# Patient Record
Sex: FEMALE | Race: WHITE | NOT HISPANIC OR LATINO | Employment: PART TIME | URBAN - METROPOLITAN AREA
[De-identification: names, ages, dates, MRNs, and addresses within clinical notes are randomized per-mention and may not be internally consistent; named-entity substitution may affect disease eponyms.]

---

## 2017-01-19 ENCOUNTER — NON-PROVIDER VISIT (OUTPATIENT)
Dept: URGENT CARE | Facility: PHYSICIAN GROUP | Age: 22
End: 2017-01-19

## 2017-01-19 DIAGNOSIS — Z11.1 SPECIAL SCREENING EXAMINATION FOR RESPIRATORY TUBERCULOSIS: ICD-10-CM

## 2017-01-19 PROCEDURE — 86580 TB INTRADERMAL TEST: CPT | Performed by: EMERGENCY MEDICINE

## 2017-01-21 ENCOUNTER — NON-PROVIDER VISIT (OUTPATIENT)
Dept: URGENT CARE | Facility: PHYSICIAN GROUP | Age: 22
End: 2017-01-21

## 2017-01-21 DIAGNOSIS — Z11.1 ENCOUNTER FOR PPD SKIN TEST READING: ICD-10-CM

## 2017-01-21 LAB — TB WHEAL 3D P 5 TU DIAM: 0 MM

## 2017-01-21 NOTE — MR AVS SNAPSHOT
Kelly Jovel   2017 4:45 PM   Non-Provider Visit   MRN: [id number]    Department:  [facility]   Dept Phone:  [phone]    Description:  Female : 1995   Provider:  [facility]           Reason for Visit     PPD Reading           Allergies as of 2017     No Known Allergies      You were diagnosed with     Encounter for PPD skin test reading   [[id number]]         Vital Signs     Smoking Status                   Never Smoker            Basic Information     Date Of Birth Sex Race Ethnicity Preferred Language    1995 Female White Non- English      Health Maintenance        Date Due Completion Dates    IMM HEP B VACCINE (1 of 3 - Primary Series) 1997 ---    IMM HEP A VACCINE (1 of 2 - Standard Series) 1998 ---    IMM HPV VACCINE (1 of 3 - Female 3 Dose Series) 2008 ---    IMM VARICELLA (CHICKENPOX) VACCINE (1 of 2 - 2 Dose Adolescent Series) 2010 ---    IMM MENINGOCOCCAL VACCINE (MCV4) (1 of 1) 2013 ---    IMM DTaP/Tdap/Td Vaccine (1 - Tdap) 2016 ---    IMM INFLUENZA (1) 2016 ---            Current Immunizations     Tuberculin Skin Test 2017      Below and/or attached are the medications your provider expects you to take. Review all of your home medications and newly ordered medications with your provider and/or pharmacist. Follow medication instructions as directed by your provider and/or pharmacist. Please keep your medication list with you and share with your provider. Update the information when medications are discontinued, doses are changed, or new medications (including over-the-counter products) are added; and carry medication information at all times in the event of emergency situations     Allergies:  No Known Allergies          Medications  Valid as of: 2017 -  4:50 PM    Generic Name Brand Name Tablet Size Instructions for use    Ibuprofen (Tab) MOTRIN 600 MG Take 1 Tab by mouth every 6 hours  as needed (with food.).        Norethin-Eth Estrad-Fe Biphas (Therapy Pack) Norethin-Eth Estrad-Fe Biphas 1 MG-10 MCG / 10 MCG Take  by mouth.        Sulfacetamide Sodium (Solution) SULAMYD 10 % Place 1 Drop in right eye every 4 hours.        .                 Medicines prescribed today were sent to:     [facility] [address] [place] [address] [place], [place] - [address] [facility] [employer] [facility] [address] [place] [address]    Phone: [phone] Fax: [phone]    Open 24 Hours?: No      Medication refill instructions:       If your prescription bottle indicates you have medication refills left, it is not necessary to call your provider’s office. Please contact your pharmacy and they will refill your medication.    If your prescription bottle indicates you do not have any refills left, you may request refills at any time through one of the following ways: The online [employer] system (except Urgent Care), by calling your provider’s office, or by asking your pharmacy to contact your provider’s office with a refill request. Medication refills are processed only during regular business hours and may not be available until the next business day. Your provider may request additional information or to have a follow-up visit with you prior to refilling your medication.   *Please Note: Medication refills are assigned a new Rx number when refilled electronically. Your pharmacy may indicate that no refills were authorized even though a new prescription for the same medication is available at the pharmacy. Please request the medicine by name with the pharmacy before contacting your provider for a refill.           [employer] Access Code: [email or web address]  Expires: 2/18/2017  3:28 PM    [employer]  A secure, online tool to manage your health information     [employer]’s [employer]® is a secure, online tool that connects you to your personalized health information from the privacy of your home -- day or night - making it very easy  for you to manage your healthcare. Once the activation process is completed, you can even access your medical information using the [employer] annette, which is available for free in the Apple Annette store or Google Play store.     [employer] provides the following levels of access (as shown below):   My Chart Features   [facility] Doctor [facility]  Specialists [facility]  Urgent  Care Non-[facility]  Primary Care  Doctor   Email your healthcare team securely and privately 24/7 X X X    Manage appointments: schedule your next appointment; view details of past/upcoming appointments X      Request prescription refills. X      View recent personal medical records, including lab and immunizations X X X X   View health record, including health history, allergies, medications X X X X   Read reports about your outpatient visits, procedures, consult and ER notes X X X X   See your discharge summary, which is a recap of your hospital and/or ER visit that includes your diagnosis, lab results, and care plan. X X       How to register for [employer]:  1. Go to  [email or web address].  2. Click on the Sign Up Now box, which takes you to the New Member Sign Up page. You will need to provide the following information:  a. Enter your [employer] Access Code exactly as it appears at the top of this page. (You will not need to use this code after you’ve completed the sign-up process. If you do not sign up before the expiration date, you must request a new code.)   b. Enter your date of birth.   c. Enter your home email address.   d. Click Submit, and follow the next screen’s instructions.  3. Create a [employer] ID. This will be your [employer] login ID and cannot be changed, so think of one that is secure and easy to remember.  4. Create a [employer] password. You can change your password at any time.  5. Enter your Password Reset Question and Answer. This can be used at a later time if you forget your password.   6. Enter your e-mail address. This allows  you to receive e-mail notifications when new information is available in [employer].  7. Click Sign Up. You can now view your health information.    For assistance activating your [employer] account, call [phone]

## 2017-01-22 NOTE — PROGRESS NOTES
Kelly Jovel is a 21 y.o. female here for a non-provider visit for PPD reading -- Step 1 of 1.      Resulted in Epic under original non-provider visit? Yes   TB evaluation questionnaire scanned into chart and original given to pt?Yes          Routed to PCP? No

## 2018-05-24 ENCOUNTER — HOSPITAL ENCOUNTER (OUTPATIENT)
Dept: LAB | Facility: MEDICAL CENTER | Age: 23
End: 2018-05-24
Attending: PHYSICIAN ASSISTANT
Payer: COMMERCIAL

## 2018-05-24 ENCOUNTER — OFFICE VISIT (OUTPATIENT)
Dept: URGENT CARE | Facility: PHYSICIAN GROUP | Age: 23
End: 2018-05-24
Payer: COMMERCIAL

## 2018-05-24 VITALS
BODY MASS INDEX: 17.84 KG/M2 | HEIGHT: 58 IN | WEIGHT: 85 LBS | DIASTOLIC BLOOD PRESSURE: 60 MMHG | HEART RATE: 82 BPM | TEMPERATURE: 99.4 F | OXYGEN SATURATION: 96 % | SYSTOLIC BLOOD PRESSURE: 96 MMHG

## 2018-05-24 DIAGNOSIS — B37.0 ORAL CANDIDIASIS: ICD-10-CM

## 2018-05-24 DIAGNOSIS — R11.2 NAUSEA AND VOMITING, INTRACTABILITY OF VOMITING NOT SPECIFIED, UNSPECIFIED VOMITING TYPE: ICD-10-CM

## 2018-05-24 LAB
ALBUMIN SERPL BCP-MCNC: 5.3 G/DL (ref 3.2–4.9)
ALBUMIN/GLOB SERPL: 2 G/DL
ALP SERPL-CCNC: 63 U/L (ref 30–99)
ALT SERPL-CCNC: 12 U/L (ref 2–50)
ANION GAP SERPL CALC-SCNC: 10 MMOL/L (ref 0–11.9)
AST SERPL-CCNC: 18 U/L (ref 12–45)
BASOPHILS # BLD AUTO: 0.6 % (ref 0–1.8)
BASOPHILS # BLD: 0.05 K/UL (ref 0–0.12)
BILIRUB SERPL-MCNC: 0.6 MG/DL (ref 0.1–1.5)
BUN SERPL-MCNC: 7 MG/DL (ref 8–22)
CALCIUM SERPL-MCNC: 10.1 MG/DL (ref 8.5–10.5)
CHLORIDE SERPL-SCNC: 103 MMOL/L (ref 96–112)
CO2 SERPL-SCNC: 23 MMOL/L (ref 20–33)
CREAT SERPL-MCNC: 0.76 MG/DL (ref 0.5–1.4)
EOSINOPHIL # BLD AUTO: 0 K/UL (ref 0–0.51)
EOSINOPHIL NFR BLD: 0 % (ref 0–6.9)
ERYTHROCYTE [DISTWIDTH] IN BLOOD BY AUTOMATED COUNT: 40.7 FL (ref 35.9–50)
GLOBULIN SER CALC-MCNC: 2.6 G/DL (ref 1.9–3.5)
GLUCOSE SERPL-MCNC: 94 MG/DL (ref 65–99)
HCT VFR BLD AUTO: 44.4 % (ref 37–47)
HGB BLD-MCNC: 14.9 G/DL (ref 12–16)
IMM GRANULOCYTES # BLD AUTO: 0.03 K/UL (ref 0–0.11)
IMM GRANULOCYTES NFR BLD AUTO: 0.3 % (ref 0–0.9)
INT CON NEG: NORMAL
INT CON POS: NORMAL
LIPASE SERPL-CCNC: 25 U/L (ref 11–82)
LYMPHOCYTES # BLD AUTO: 2.29 K/UL (ref 1–4.8)
LYMPHOCYTES NFR BLD: 26.6 % (ref 22–41)
MCH RBC QN AUTO: 29.8 PG (ref 27–33)
MCHC RBC AUTO-ENTMCNC: 33.6 G/DL (ref 33.6–35)
MCV RBC AUTO: 88.8 FL (ref 81.4–97.8)
MONOCYTES # BLD AUTO: 0.44 K/UL (ref 0–0.85)
MONOCYTES NFR BLD AUTO: 5.1 % (ref 0–13.4)
NEUTROPHILS # BLD AUTO: 5.79 K/UL (ref 2–7.15)
NEUTROPHILS NFR BLD: 67.4 % (ref 44–72)
NRBC # BLD AUTO: 0 K/UL
NRBC BLD-RTO: 0 /100 WBC
PLATELET # BLD AUTO: 220 K/UL (ref 164–446)
PMV BLD AUTO: 11.2 FL (ref 9–12.9)
POC URINE PREGNANCY TEST: NEGATIVE
POTASSIUM SERPL-SCNC: 4.2 MMOL/L (ref 3.6–5.5)
PROT SERPL-MCNC: 7.9 G/DL (ref 6–8.2)
RBC # BLD AUTO: 5 M/UL (ref 4.2–5.4)
SODIUM SERPL-SCNC: 136 MMOL/L (ref 135–145)
WBC # BLD AUTO: 8.6 K/UL (ref 4.8–10.8)

## 2018-05-24 PROCEDURE — 36415 COLL VENOUS BLD VENIPUNCTURE: CPT

## 2018-05-24 PROCEDURE — 81025 URINE PREGNANCY TEST: CPT | Performed by: PHYSICIAN ASSISTANT

## 2018-05-24 PROCEDURE — 83690 ASSAY OF LIPASE: CPT

## 2018-05-24 PROCEDURE — 99214 OFFICE O/P EST MOD 30 MIN: CPT | Performed by: PHYSICIAN ASSISTANT

## 2018-05-24 PROCEDURE — 85025 COMPLETE CBC W/AUTO DIFF WBC: CPT

## 2018-05-24 PROCEDURE — 80053 COMPREHEN METABOLIC PANEL: CPT

## 2018-05-24 RX ORDER — FLUCONAZOLE 150 MG/1
TABLET ORAL
Qty: 2 TAB | Refills: 0 | Status: ON HOLD | OUTPATIENT
Start: 2018-05-24 | End: 2023-08-26

## 2018-05-24 NOTE — PROGRESS NOTES
"Chief Complaint   Patient presents with   • Nausea     digestive problems, vomiting, poss reaction to meds, x1 week         HISTORY OF PRESENT ILLNESS: Patient is a 22 y.o. female who presents today for 1 week of intermittent, waxing and waning stomach discomfort and nausea/vomiting.    The last episode of vomiting was two days ago.  She has had some appetite but stomach seems upset after eating.  Some throat irritation.  No fevers or chills.   No diarrhea.      Patient did take some abx from Thursday - Sunday.  She was on Tinidazole for BV.   She does not drink alcohol but admits she does drink Kombucha which she believes has some alcohol in it.  Does smoke cannabis fairly regularly.     There are no active problems to display for this patient.      Allergies:Patient has no known allergies.    No current [facility]-ordered outpatient prescriptions on file.     No current [facility]-ordered facility-administered medications on file.        History reviewed. No pertinent past medical history.    Social History   Substance Use Topics   • Smoking status: Never Smoker   • Smokeless tobacco: Never Used   • Alcohol use No      Comment: Rarely        No family status information on file.   History reviewed. No pertinent family history.    ROS:  Review of Systems   Constitutional: Negative for fever, chills, weight loss and malaise/fatigue.   HENT: SEE HPI  Eyes: Negative for blurred vision.   Respiratory: Negative for cough, sputum production, shortness of breath and wheezing.    Cardiovascular: Negative for chest pain, palpitations, orthopnea and leg swelling.   Gastrointestinal: SEE HPI  Genitourinary: Negative for dysuria, urgency and frequency.     Exam:  Blood pressure (!) 96/60, pulse 82, temperature 37.4 °C (99.4 °F), height 1.473 m (4' 10\"), weight 38.6 kg (85 lb), SpO2 96 %.  General:  Well nourished, well developed female in NAD  Eyes: PERRLA, EOM within normal limits, no conjunctival injection, no scleral icterus, visual " fields and acuity grossly intact.  Ears: Normal shape and symmetry, no tenderness, no discharge. External canals are without any significant edema or erythema. Tympanic membranes are without any inflammation, no effusion. Gross auditory acuity is intact  Nose: Symmetrical, sinuses without tenderness, no discharge.   Mouth: reasonable hygiene, mild erythema, appears to have a few areas of faint brushable exudates.    Neck: no masses, range of motion within normal limits, no tracheal deviation. No lymphadenopathy  Pulmonary: Normal respiratory effort, no wheezes, crackles, or rhonchi.  Cardiovascular: regular rate and rhythm without murmurs, rubs, or gallops.  Abdomen: Soft, nontender, nondistended. Normal bowel sounds. No hepatosplenomegaly or masses, or hernias. No rebound or guarding.  Skin: No visible rashes or lesion. Warm, pink, dry.   Extremities: no clubbing, cyanosis, or edema.  Neuro: A&O x 3. Speech normal/clear.  Normal gait.         Assessment/Plan:  1. Nausea and vomiting, intractability of vomiting not specified, unspecified vomiting type  POCT Pregnancy    CBC WITH DIFFERENTIAL    COMP METABOLIC PANEL    LIPASE   2. Oral candidiasis  fluconazole (DIFLUCAN) 150 MG tablet       -preg neg.   -lab screening as above, will follow   -suspect abx have caused GI symptoms, possible mild reaction secondary to kombucha mild alcohol content.  She is done with abx but d/c this.  Recommend hydration, bland diet as tolerated.   She does have some evidence of very mild oral candida, likely secondary to abx as well, rx sent as above.      Supportive care, differential diagnoses, and indications for immediate follow-up discussed with patient.   Pathogenesis of diagnosis discussed including typical length and natural progression.   Instructed to return to clinic or nearest emergency department for any change in condition, further concerns, or worsening of symptoms.  Patient states understanding of the plan of care and  discharge instructions.  Instructed to make an appointment, for follow up, with their primary care provider.      Bela Alvarado P.A.-C.

## 2019-08-12 ENCOUNTER — OFFICE VISIT (OUTPATIENT)
Dept: URGENT CARE | Facility: PHYSICIAN GROUP | Age: 24
End: 2019-08-12
Payer: COMMERCIAL

## 2019-08-12 VITALS
SYSTOLIC BLOOD PRESSURE: 96 MMHG | TEMPERATURE: 99.2 F | WEIGHT: 87 LBS | OXYGEN SATURATION: 98 % | HEIGHT: 59 IN | DIASTOLIC BLOOD PRESSURE: 62 MMHG | RESPIRATION RATE: 16 BRPM | HEART RATE: 117 BPM | BODY MASS INDEX: 17.54 KG/M2

## 2019-08-12 DIAGNOSIS — J02.0 STREP PHARYNGITIS: ICD-10-CM

## 2019-08-12 LAB
INT CON NEG: NEGATIVE
INT CON POS: POSITIVE
S PYO AG THROAT QL: NORMAL

## 2019-08-12 PROCEDURE — 87880 STREP A ASSAY W/OPTIC: CPT | Performed by: PHYSICIAN ASSISTANT

## 2019-08-12 PROCEDURE — 99214 OFFICE O/P EST MOD 30 MIN: CPT | Performed by: PHYSICIAN ASSISTANT

## 2019-08-12 RX ORDER — AMOXICILLIN 500 MG/1
500 CAPSULE ORAL 2 TIMES DAILY
Qty: 20 CAP | Refills: 0 | Status: SHIPPED | OUTPATIENT
Start: 2019-08-12 | End: 2019-08-22

## 2019-08-12 ASSESSMENT — ENCOUNTER SYMPTOMS
HEADACHES: 1
FEVER: 0
COUGH: 0
CHILLS: 0
SORE THROAT: 1

## 2019-08-12 NOTE — PROGRESS NOTES
"Subjective:   Kelly Jovel is a 24 y.o. female who presents for Sore Throat (hurts to talk, hurts to swallow, headache, ear pain, X today )    This is a new problem.  Patient presents urgent care with sudden onset this morning of severe sore throat with difficulty swallowing due to sore throat as well as headache and ear pain.  Patient denies any recent runny nose, congestion or cough.  Denies fever or chills.  She has had no known exposure to strep.        HPI  Review of Systems   Constitutional: Negative for chills, fever and malaise/fatigue.   HENT: Positive for ear pain and sore throat. Negative for congestion.    Respiratory: Negative for cough.    Neurological: Positive for headaches.   All other systems reviewed and are negative.    No Known Allergies     Objective:   BP (!) 96/62   Pulse (!) 117   Temp 37.3 °C (99.2 °F)   Resp 16   Ht 1.499 m (4' 11\")   Wt 39.5 kg (87 lb)   SpO2 98%   BMI 17.57 kg/m²   Physical Exam   Constitutional: She is oriented to person, place, and time. She appears well-developed and well-nourished. She does not appear ill. No distress.   HENT:   Head: Normocephalic and atraumatic.   Right Ear: Tympanic membrane, external ear and ear canal normal.   Left Ear: Tympanic membrane, external ear and ear canal normal.   Nose: Nose normal.   Mouth/Throat: Uvula is midline and mucous membranes are normal. Posterior oropharyngeal erythema present. No oropharyngeal exudate. Tonsils are 3+ on the right. Tonsils are 3+ on the left. No tonsillar exudate.   Eyes: Pupils are equal, round, and reactive to light. Conjunctivae and EOM are normal.   Neck: Normal range of motion. Neck supple.   Cardiovascular: Normal rate, regular rhythm and normal heart sounds. Exam reveals no gallop and no friction rub.   No murmur heard.  Pulmonary/Chest: Effort normal and breath sounds normal. No respiratory distress. She has no wheezes. She has no rales.   Abdominal: Soft. Bowel sounds are normal. " She exhibits no distension and no mass. There is no hepatosplenomegaly. There is no tenderness. There is no rebound and no guarding.   Musculoskeletal: Normal range of motion. She exhibits no edema, tenderness or deformity.   Lymphadenopathy:        Head (right side): No submental, no submandibular and no tonsillar adenopathy present.        Head (left side): Tonsillar adenopathy present. No submental and no submandibular adenopathy present.     She has no cervical adenopathy.     She has no axillary adenopathy.        Right: No inguinal and no supraclavicular adenopathy present.        Left: No inguinal and no supraclavicular adenopathy present.   Neurological: She is alert and oriented to person, place, and time. She has normal strength. No cranial nerve deficit or sensory deficit. Coordination normal.   Skin: Skin is warm and dry. No rash noted.   Psychiatric: She has a normal mood and affect. Judgment normal.   Vitals reviewed.          Assessment/Plan:   Assessment    1. Strep pharyngitis  - POCT Rapid Strep A: Positive  - amoxicillin (AMOXIL) 500 MG Cap; Take 1 Cap by mouth 2 times a day for 10 days.  Dispense: 20 Cap; Refill: 0    Increase fluids, rest.  Patient may use salt water gargles, ice pops, cool fluids.    Patient will be treated with amoxicillin as above.  Contagion reviewed.  Recommend changing toothbrush in 24 hours.    Differential diagnosis, natural history, supportive care, and indications for immediate follow-up discussed.    If not improving in 3-5 days, F/U with PCP or return to  or sooner if worsens  Red flag warning symptoms and strict ER/follow-up precautions given.     The patient demonstrated a good understanding and agreed with the treatment plan.  Please note that this note was created using voice recognition speech to text software. Every effort has been made to correct obvious errors.  However, I expect there are errors of grammar and possibly context that were not discovered  prior to finalizing the note  KARMEN Llamas PA-C

## 2019-11-06 ENCOUNTER — HOSPITAL ENCOUNTER (OUTPATIENT)
Dept: LAB | Facility: MEDICAL CENTER | Age: 24
End: 2019-11-06
Attending: INTERNAL MEDICINE
Payer: COMMERCIAL

## 2019-11-06 ENCOUNTER — HOSPITAL ENCOUNTER (OUTPATIENT)
Dept: RADIOLOGY | Facility: MEDICAL CENTER | Age: 24
End: 2019-11-06
Attending: INTERNAL MEDICINE
Payer: COMMERCIAL

## 2019-11-06 DIAGNOSIS — R11.2 NAUSEA AND VOMITING, INTRACTABILITY OF VOMITING NOT SPECIFIED, UNSPECIFIED VOMITING TYPE: ICD-10-CM

## 2019-11-06 LAB
ALBUMIN SERPL BCP-MCNC: 5.1 G/DL (ref 3.2–4.9)
ALBUMIN/GLOB SERPL: 1.6 G/DL
ALP SERPL-CCNC: 65 U/L (ref 30–99)
ALT SERPL-CCNC: 9 U/L (ref 2–50)
ANION GAP SERPL CALC-SCNC: 9 MMOL/L (ref 0–11.9)
AST SERPL-CCNC: 18 U/L (ref 12–45)
BASOPHILS # BLD AUTO: 1 % (ref 0–1.8)
BASOPHILS # BLD: 0.06 K/UL (ref 0–0.12)
BILIRUB SERPL-MCNC: 0.4 MG/DL (ref 0.1–1.5)
BUN SERPL-MCNC: 8 MG/DL (ref 8–22)
CALCIUM SERPL-MCNC: 10 MG/DL (ref 8.5–10.5)
CHLORIDE SERPL-SCNC: 103 MMOL/L (ref 96–112)
CO2 SERPL-SCNC: 27 MMOL/L (ref 20–33)
CORTIS SERPL-MCNC: 16.8 UG/DL (ref 0–23)
CREAT SERPL-MCNC: 0.67 MG/DL (ref 0.5–1.4)
EOSINOPHIL # BLD AUTO: 0.06 K/UL (ref 0–0.51)
EOSINOPHIL NFR BLD: 1 % (ref 0–6.9)
ERYTHROCYTE [DISTWIDTH] IN BLOOD BY AUTOMATED COUNT: 42 FL (ref 35.9–50)
ERYTHROCYTE [SEDIMENTATION RATE] IN BLOOD BY WESTERGREN METHOD: 3 MM/HOUR (ref 0–20)
GLOBULIN SER CALC-MCNC: 3.1 G/DL (ref 1.9–3.5)
GLUCOSE SERPL-MCNC: 88 MG/DL (ref 65–99)
HCT VFR BLD AUTO: 45.4 % (ref 37–47)
HGB BLD-MCNC: 15 G/DL (ref 12–16)
IMM GRANULOCYTES # BLD AUTO: 0.01 K/UL (ref 0–0.11)
IMM GRANULOCYTES NFR BLD AUTO: 0.2 % (ref 0–0.9)
LYMPHOCYTES # BLD AUTO: 1.87 K/UL (ref 1–4.8)
LYMPHOCYTES NFR BLD: 31.7 % (ref 22–41)
MCH RBC QN AUTO: 29.9 PG (ref 27–33)
MCHC RBC AUTO-ENTMCNC: 33 G/DL (ref 33.6–35)
MCV RBC AUTO: 90.4 FL (ref 81.4–97.8)
MONOCYTES # BLD AUTO: 0.4 K/UL (ref 0–0.85)
MONOCYTES NFR BLD AUTO: 6.8 % (ref 0–13.4)
NEUTROPHILS # BLD AUTO: 3.49 K/UL (ref 2–7.15)
NEUTROPHILS NFR BLD: 59.3 % (ref 44–72)
NRBC # BLD AUTO: 0 K/UL
NRBC BLD-RTO: 0 /100 WBC
PLATELET # BLD AUTO: 201 K/UL (ref 164–446)
PMV BLD AUTO: 11.3 FL (ref 9–12.9)
POTASSIUM SERPL-SCNC: 4.1 MMOL/L (ref 3.6–5.5)
PROT SERPL-MCNC: 8.2 G/DL (ref 6–8.2)
RBC # BLD AUTO: 5.02 M/UL (ref 4.2–5.4)
SODIUM SERPL-SCNC: 139 MMOL/L (ref 135–145)
T4 FREE SERPL-MCNC: 0.81 NG/DL (ref 0.53–1.43)
TSH SERPL DL<=0.005 MIU/L-ACNC: 0.95 UIU/ML (ref 0.38–5.33)
WBC # BLD AUTO: 5.9 K/UL (ref 4.8–10.8)

## 2019-11-06 PROCEDURE — 85025 COMPLETE CBC W/AUTO DIFF WBC: CPT

## 2019-11-06 PROCEDURE — 82533 TOTAL CORTISOL: CPT

## 2019-11-06 PROCEDURE — 36415 COLL VENOUS BLD VENIPUNCTURE: CPT

## 2019-11-06 PROCEDURE — 80053 COMPREHEN METABOLIC PANEL: CPT

## 2019-11-06 PROCEDURE — 84443 ASSAY THYROID STIM HORMONE: CPT

## 2019-11-06 PROCEDURE — 84439 ASSAY OF FREE THYROXINE: CPT

## 2019-11-06 PROCEDURE — 85652 RBC SED RATE AUTOMATED: CPT

## 2019-11-06 PROCEDURE — 76700 US EXAM ABDOM COMPLETE: CPT

## 2020-05-27 ENCOUNTER — APPOINTMENT (RX ONLY)
Dept: URBAN - METROPOLITAN AREA CLINIC 4 | Facility: CLINIC | Age: 25
Setting detail: DERMATOLOGY
End: 2020-05-27

## 2020-05-27 DIAGNOSIS — B00.1 HERPESVIRAL VESICULAR DERMATITIS: ICD-10-CM

## 2020-05-27 PROCEDURE — 99202 OFFICE O/P NEW SF 15 MIN: CPT

## 2020-05-27 PROCEDURE — ? PRESCRIPTION

## 2020-05-27 PROCEDURE — ? DIAGNOSIS COMMENT

## 2020-05-27 PROCEDURE — ? COUNSELING

## 2020-05-27 PROCEDURE — ? ORDER TESTS

## 2020-05-27 RX ORDER — MUPIROCIN 20 MG/G
OINTMENT TOPICAL
Qty: 1 | Refills: 3 | Status: ERX | COMMUNITY
Start: 2020-05-27

## 2020-05-27 RX ADMIN — MUPIROCIN: 20 OINTMENT TOPICAL at 00:00

## 2020-05-27 ASSESSMENT — LOCATION DETAILED DESCRIPTION DERM: LOCATION DETAILED: RIGHT LOWER CUTANEOUS LIP

## 2020-05-27 ASSESSMENT — LOCATION ZONE DERM: LOCATION ZONE: LIP

## 2020-05-27 ASSESSMENT — LOCATION SIMPLE DESCRIPTION DERM: LOCATION SIMPLE: RIGHT LIP

## 2020-05-27 NOTE — PROCEDURE: DIAGNOSIS COMMENT
Comment: Favor HSV at this time, secondary bacterial infection also considered. Rx for valtrex and mupirocin given. Discussed viral and bacterial cultures, patient requests this be done today. \\nAvoid touching lesions. Use gentle facial wash. No other systemic symptoms. No new medications.
Detail Level: Detailed

## 2020-05-27 NOTE — HPI: EVALUATION OF SKIN LESION(S)
What Type Of Note Output Would You Prefer (Optional)?: Standard Output
How Severe Are Your Spot(S)?: mild
Have Your Spot(S) Been Treated In The Past?: has not been treated
Hpi Title: Evaluation of Skin Lesions
Additional History: Has pictures of progression

## 2020-05-27 NOTE — PROCEDURE: ORDER TESTS
Bill For Surgical Tray: no
Billing Type: Third-Party Bill
Performing Laboratory: [id number]
Expected Date Of Service: 05/27/2020

## 2023-08-15 ENCOUNTER — INITIAL PRENATAL (OUTPATIENT)
Dept: OBGYN | Facility: CLINIC | Age: 28
End: 2023-08-15
Payer: MEDICAID

## 2023-08-15 ENCOUNTER — APPOINTMENT (OUTPATIENT)
Dept: RADIOLOGY | Facility: IMAGING CENTER | Age: 28
End: 2023-08-15
Payer: MEDICAID

## 2023-08-15 VITALS — BODY MASS INDEX: 16.8 KG/M2 | SYSTOLIC BLOOD PRESSURE: 96 MMHG | DIASTOLIC BLOOD PRESSURE: 52 MMHG | WEIGHT: 83.2 LBS

## 2023-08-15 DIAGNOSIS — Z34.01 ENCOUNTER FOR SUPERVISION OF NORMAL FIRST PREGNANCY, FIRST TRIMESTER: Primary | ICD-10-CM

## 2023-08-15 DIAGNOSIS — Z3A.10 10 WEEKS GESTATION OF PREGNANCY: ICD-10-CM

## 2023-08-15 PROCEDURE — 0501F PRENATAL FLOW SHEET: CPT | Performed by: NURSE PRACTITIONER

## 2023-08-15 PROCEDURE — 3078F DIAST BP <80 MM HG: CPT | Performed by: NURSE PRACTITIONER

## 2023-08-15 PROCEDURE — 3074F SYST BP LT 130 MM HG: CPT | Performed by: NURSE PRACTITIONER

## 2023-08-15 PROCEDURE — 76801 OB US < 14 WKS SINGLE FETUS: CPT | Mod: TC | Performed by: NURSE PRACTITIONER

## 2023-08-15 ASSESSMENT — EDINBURGH POSTNATAL DEPRESSION SCALE (EPDS)
I HAVE BEEN ABLE TO LAUGH AND SEE THE FUNNY SIDE OF THINGS: AS MUCH AS I ALWAYS COULD
THE THOUGHT OF HARMING MYSELF HAS OCCURRED TO ME: NEVER
THINGS HAVE BEEN GETTING ON TOP OF ME: NO, MOST OF THE TIME I HAVE COPED QUITE WELL
I HAVE FELT SAD OR MISERABLE: NOT VERY OFTEN
I HAVE BEEN SO UNHAPPY THAT I HAVE HAD DIFFICULTY SLEEPING: NOT VERY OFTEN
I HAVE BLAMED MYSELF UNNECESSARILY WHEN THINGS WENT WRONG: YES, SOME OF THE TIME
I HAVE FELT SCARED OR PANICKY FOR NO GOOD REASON: YES, SOMETIMES
I HAVE BEEN SO UNHAPPY THAT I HAVE BEEN CRYING: ONLY OCCASIONALLY
I HAVE BEEN ANXIOUS OR WORRIED FOR NO GOOD REASON: YES, SOMETIMES
I HAVE LOOKED FORWARD WITH ENJOYMENT TO THINGS: AS MUCH AS I EVER DID
TOTAL SCORE: 10

## 2023-08-15 ASSESSMENT — ENCOUNTER SYMPTOMS
MUSCULOSKELETAL NEGATIVE: 1
CONSTITUTIONAL NEGATIVE: 1
CARDIOVASCULAR NEGATIVE: 1
NEUROLOGICAL NEGATIVE: 1
EYES NEGATIVE: 1
GASTROINTESTINAL NEGATIVE: 1
RESPIRATORY NEGATIVE: 1
PSYCHIATRIC NEGATIVE: 1

## 2023-08-15 NOTE — PROGRESS NOTES
S:  Kelly Jovel is a 28 y.o.  who presents for her new OB exam.  She is 6w1d with and YURIY of Estimated Date of Delivery: 24 based off of US done today. She thought she was 9w4d per LMP. Discussed reasons for dating discrepancy, and also informed that we would use US dating for her YURIY. She has no complaints.  She is currently working at [employer] as a . Discussed heavy lifting and chemical exposure. No ER visits or previous care in this pregnancy.     Unsure about AFP.  Declines CF.  Denies VB, LOF, or cramping.  Denies dysuria, vaginal DC. Reports no fetal movement.     Pt is single and lives by her self on her parents property in an .  Pregnancy is unplanned but desired. FOB is not involved.      Discussed diet and exercise during pregnancy. Encouraged good nutrition, and daily exercise including walking or swimming. Discussed expected weight gain during pregnancy. Discussed adequate hydration during pregnancy.    EPDS today is 10, no history of depression or anxiety. Thinks this is due to unplanned pregnancy and partner not involved. Denies any SI or HI, and feels like she has a great support system at home and work.    History reviewed. No pertinent past medical history.  Family History   Problem Relation Age of Onset    No Known Problems Mother     No Known Problems Father     No Known Problems Maternal Grandmother     No Known Problems Maternal Grandfather     No Known Problems Paternal Grandmother     Glaucoma Paternal Grandfather      Social History     Socioeconomic History    Marital status: Single     Spouse name: Not on file    Number of children: Not on file    Years of education: Not on file    Highest education level: Not on file   Occupational History    Not on file   Tobacco Use    Smoking status: Never    Smokeless tobacco: Never   Vaping Use    Vaping Use: Never used   Substance and Sexual Activity    Alcohol use: No     Comment: Rarely      Drug use: Yes     Types: Marijuana    Sexual activity: Not Currently     Partners: Male   Other Topics Concern    Behavioral problems Not Asked    Interpersonal relationships Not Asked    Sad or not enjoying activities Not Asked    Suicidal thoughts Not Asked    Poor school performance Not Asked    Reading difficulties Not Asked    Speech difficulties Not Asked    Writing difficulties Not Asked    Inadequate sleep Not Asked    Excessive TV viewing Not Asked    Excessive video game use Not Asked    Inadequate exercise Not Asked    Sports related Not Asked    Poor diet Not Asked    Family concerns for drug/alcohol abuse Not Asked    Poor oral hygiene Not Asked    Bike safety Not Asked    Family concerns vehicle safety Not Asked   Social History Narrative    Not on file     Social Determinants of Health     Financial Resource Strain: Not on file   Food Insecurity: Not on file   Transportation Needs: Not on file   Physical Activity: Not on file   Stress: Not on file   Social Connections: Not on file   Intimate Partner Violence: Not on file   Housing Stability: Not on file     OB History    Para Term  AB Living   1             SAB IAB Ectopic Molar Multiple Live Births                    # Outcome Date GA Lbr Trav/2nd Weight Sex Delivery Anes PTL Lv   1 Current                History of Varicella Virus: no  History of HSV I or II in self or partner: no  History of Thyroid problems: no    O:  BP 96/52   Wt 83 lb 3.2 oz    See Prenatal Physical.    Wet mount: deferred, no s/sx  Chaperone offered: n/a    A:   1.  IUP @ 6w1d per US done today        2.  S=D        3.  See problem list below        4.  Early first trimester pregnancy       Patient Active Problem List    Diagnosis Date Noted    Encounter for supervision of normal first pregnancy, first trimester 08/15/2023         P:  1.  GC/CT ordered through [facility], states had pap in  which was WNL- records requested        2.  Prenatal labs ordered - lab  slip given        3.  Discussed PNV, diet, avoidances and adequate water intake        4.  NOB packet given        5.  Return to office in 4 wks        6.  Complete OB US in 13 wks        7.  AFP and NIPT to be discussed later    Orders Placed This Encounter    US-OB 2ND 3RD TRI COMPLETE    Chlamydia/GC, PCR (Urine)    PREG CNTR PRENATAL PN    URINE DRUG SCREEN W/CONF (AR)    Prenatal MV-Min-Fe Fum-FA-DHA (PRENATAL 1 PO)        HPI    Review of Systems   Constitutional: Negative.    HENT: Negative.     Eyes: Negative.    Respiratory: Negative.     Cardiovascular: Negative.    Gastrointestinal: Negative.    Genitourinary: Negative.    Musculoskeletal: Negative.    Skin: Negative.    Neurological: Negative.    Endo/Heme/Allergies: Negative.    Psychiatric/Behavioral: Negative.     All other systems reviewed and are negative.      Objective     BP 96/52   Wt 83 lb 3.2 oz   LMP 06/09/2023   BMI 16.80 kg/m²      Physical Exam  Vitals and nursing note reviewed. Exam conducted with a chaperone present.   Constitutional:       Appearance: Normal appearance. She is normal weight.   HENT:      Head: Normocephalic.      Nose: Nose normal.   Eyes:      Extraocular Movements: Extraocular movements intact.   Cardiovascular:      Rate and Rhythm: Normal rate.      Pulses: Normal pulses.      Heart sounds: Normal heart sounds.   Pulmonary:      Effort: Pulmonary effort is normal.      Breath sounds: Normal breath sounds.   Abdominal:      Palpations: Abdomen is soft.   Musculoskeletal:         General: Normal range of motion.      Cervical back: Normal range of motion and neck supple.   Skin:     General: Skin is warm and dry.      Capillary Refill: Capillary refill takes less than 2 seconds.   Neurological:      General: No focal deficit present.      Mental Status: She is alert and oriented to person, place, and time.   Psychiatric:         Mood and Affect: Mood normal.         Behavior: Behavior normal.         Thought  Content: Thought content normal.         Judgment: Judgment normal.       Assessment & Plan       1. Encounter for supervision of normal first pregnancy, first trimester  YURIY 4/8/2024 per US  - Chlamydia/GC, PCR (Urine); Future  - PREG CNTR PRENATAL PN; Future  - URINE DRUG SCREEN W/CONF (AR); Future  - US-OB 2ND 3RD TRI COMPLETE; Future

## 2023-08-15 NOTE — PROGRESS NOTES
Pt. Here for NOB visit today.  # [phone]  First prenatal care  Pt. States no issues or concerns.   Pharmacy verified  Pt Declines AFP  Chaperone offered and not indicated    Pap- May 2021, WNL  EPDS 10

## 2023-08-22 ENCOUNTER — HOSPITAL ENCOUNTER (OUTPATIENT)
Dept: LAB | Facility: MEDICAL CENTER | Age: 28
End: 2023-08-22
Attending: NURSE PRACTITIONER
Payer: MEDICAID

## 2023-08-22 DIAGNOSIS — Z34.01 ENCOUNTER FOR SUPERVISION OF NORMAL FIRST PREGNANCY, FIRST TRIMESTER: ICD-10-CM

## 2023-08-22 LAB
ABO GROUP BLD: NORMAL
BLD GP AB SCN SERPL QL: NORMAL
ERYTHROCYTE [DISTWIDTH] IN BLOOD BY AUTOMATED COUNT: 45.3 FL (ref 35.9–50)
HBV SURFACE AG SER QL: NORMAL
HCT VFR BLD AUTO: 38.8 % (ref 37–47)
HCV AB SER QL: NORMAL
HGB BLD-MCNC: 13.1 G/DL (ref 12–16)
HIV 1+2 AB+HIV1 P24 AG SERPL QL IA: NORMAL
MCH RBC QN AUTO: 31.2 PG (ref 27–33)
MCHC RBC AUTO-ENTMCNC: 33.8 G/DL (ref 32.2–35.5)
MCV RBC AUTO: 92.4 FL (ref 81.4–97.8)
PLATELET # BLD AUTO: 206 K/UL (ref 164–446)
PMV BLD AUTO: 11.4 FL (ref 9–12.9)
RBC # BLD AUTO: 4.2 M/UL (ref 4.2–5.4)
RH BLD: NORMAL
RUBV AB SER QL: 169 IU/ML
T PALLIDUM AB SER QL IA: NORMAL
WBC # BLD AUTO: 8.7 K/UL (ref 4.8–10.8)

## 2023-08-22 PROCEDURE — 86900 BLOOD TYPING SEROLOGIC ABO: CPT

## 2023-08-22 PROCEDURE — 86901 BLOOD TYPING SEROLOGIC RH(D): CPT

## 2023-08-22 PROCEDURE — 86780 TREPONEMA PALLIDUM: CPT

## 2023-08-22 PROCEDURE — 87591 N.GONORRHOEAE DNA AMP PROB: CPT

## 2023-08-22 PROCEDURE — 80307 DRUG TEST PRSMV CHEM ANLYZR: CPT

## 2023-08-22 PROCEDURE — 87491 CHLMYD TRACH DNA AMP PROBE: CPT

## 2023-08-22 PROCEDURE — 85027 COMPLETE CBC AUTOMATED: CPT

## 2023-08-22 PROCEDURE — 86850 RBC ANTIBODY SCREEN: CPT

## 2023-08-22 PROCEDURE — 87389 HIV-1 AG W/HIV-1&-2 AB AG IA: CPT

## 2023-08-22 PROCEDURE — 87340 HEPATITIS B SURFACE AG IA: CPT

## 2023-08-22 PROCEDURE — 87086 URINE CULTURE/COLONY COUNT: CPT

## 2023-08-22 PROCEDURE — 86762 RUBELLA ANTIBODY: CPT

## 2023-08-22 PROCEDURE — 86803 HEPATITIS C AB TEST: CPT

## 2023-08-22 PROCEDURE — 36415 COLL VENOUS BLD VENIPUNCTURE: CPT

## 2023-08-23 PROBLEM — O26.899 RH NEGATIVE STATE IN ANTEPARTUM PERIOD: Status: ACTIVE | Noted: 2023-08-23

## 2023-08-23 PROBLEM — Z67.91 RH NEGATIVE STATE IN ANTEPARTUM PERIOD: Status: ACTIVE | Noted: 2023-08-23

## 2023-08-23 LAB
C TRACH DNA SPEC QL NAA+PROBE: NEGATIVE
N GONORRHOEA DNA SPEC QL NAA+PROBE: NEGATIVE
SPECIMEN SOURCE: NORMAL

## 2023-08-24 LAB
AMPHET CTO UR CFM-MCNC: NEGATIVE NG/ML
BACTERIA UR CULT: NORMAL
BARBITURATES CTO UR CFM-MCNC: NEGATIVE NG/ML
BENZODIAZ CTO UR CFM-MCNC: NEGATIVE NG/ML
CANNABINOIDS CTO UR CFM-MCNC: POSITIVE NG/ML
COCAINE CTO UR CFM-MCNC: NEGATIVE NG/ML
DRUG COMMENT 753798: NORMAL
METHADONE CTO UR CFM-MCNC: NEGATIVE NG/ML
OPIATES CTO UR CFM-MCNC: NEGATIVE NG/ML
PCP CTO UR CFM-MCNC: NEGATIVE NG/ML
PROPOXYPH CTO UR CFM-MCNC: NEGATIVE NG/ML
SIGNIFICANT IND 70042: NORMAL
SITE SITE: NORMAL
SOURCE SOURCE: NORMAL

## 2023-08-25 ENCOUNTER — TELEPHONE (OUTPATIENT)
Dept: OBGYN | Facility: CLINIC | Age: 28
End: 2023-08-25
Payer: MEDICAID

## 2023-08-25 NOTE — TELEPHONE ENCOUNTER
3:39 : Pt called concerned about the spotting she is having. States she has had some light cramping with light pink spotting. Pt is 7w4d and has an OBFU apt 9/12/23. Pt wanted a sooner apt if possible. I went over SAB precautions with her and told her things to look out for. I told her if anything changes and falls under what we discussed that she should go to the ER for evaluation. I then transferred her back to [facility] to see if there were any sooner OBFU's. Pt agreed and call was transferred.

## 2023-08-26 ENCOUNTER — HOSPITAL ENCOUNTER (EMERGENCY)
Facility: MEDICAL CENTER | Age: 28
End: 2023-08-26
Attending: EMERGENCY MEDICINE
Payer: MEDICAID

## 2023-08-26 ENCOUNTER — ANESTHESIA (OUTPATIENT)
Dept: SURGERY | Facility: MEDICAL CENTER | Age: 28
End: 2023-08-26
Payer: MEDICAID

## 2023-08-26 ENCOUNTER — ANESTHESIA EVENT (OUTPATIENT)
Dept: SURGERY | Facility: MEDICAL CENTER | Age: 28
End: 2023-08-26
Payer: MEDICAID

## 2023-08-26 ENCOUNTER — HOSPITAL ENCOUNTER (EMERGENCY)
Facility: MEDICAL CENTER | Age: 28
End: 2023-08-27
Attending: EMERGENCY MEDICINE
Payer: MEDICAID

## 2023-08-26 ENCOUNTER — APPOINTMENT (OUTPATIENT)
Dept: RADIOLOGY | Facility: MEDICAL CENTER | Age: 28
End: 2023-08-26
Attending: EMERGENCY MEDICINE
Payer: MEDICAID

## 2023-08-26 ENCOUNTER — APPOINTMENT (OUTPATIENT)
Dept: RADIOLOGY | Facility: MEDICAL CENTER | Age: 28
End: 2023-08-26
Attending: OBSTETRICS & GYNECOLOGY
Payer: MEDICAID

## 2023-08-26 VITALS
HEART RATE: 100 BPM | SYSTOLIC BLOOD PRESSURE: 96 MMHG | RESPIRATION RATE: 18 BRPM | DIASTOLIC BLOOD PRESSURE: 60 MMHG | WEIGHT: 85.98 LBS | HEIGHT: 59 IN | OXYGEN SATURATION: 98 % | BODY MASS INDEX: 17.33 KG/M2 | TEMPERATURE: 98.1 F

## 2023-08-26 DIAGNOSIS — O03.4: ICD-10-CM

## 2023-08-26 DIAGNOSIS — Z67.91 BLOOD TYPE, RH NEGATIVE: ICD-10-CM

## 2023-08-26 DIAGNOSIS — O03.9 MISCARRIAGE: ICD-10-CM

## 2023-08-26 DIAGNOSIS — I95.9 HYPOTENSION, UNSPECIFIED HYPOTENSION TYPE: ICD-10-CM

## 2023-08-26 LAB
ACTION RH IMMUNE GLOB 8505RHG: NORMAL
ALBUMIN SERPL BCP-MCNC: 4.7 G/DL (ref 3.2–4.9)
ALBUMIN SERPL BCP-MCNC: 4.8 G/DL (ref 3.2–4.9)
ALBUMIN/GLOB SERPL: 1.7 G/DL
ALBUMIN/GLOB SERPL: 1.7 G/DL
ALP SERPL-CCNC: 52 U/L (ref 30–99)
ALP SERPL-CCNC: 57 U/L (ref 30–99)
ALT SERPL-CCNC: 13 U/L (ref 2–50)
ALT SERPL-CCNC: 14 U/L (ref 2–50)
ANION GAP SERPL CALC-SCNC: 13 MMOL/L (ref 7–16)
ANION GAP SERPL CALC-SCNC: 16 MMOL/L (ref 7–16)
APPEARANCE UR: ABNORMAL
AST SERPL-CCNC: 17 U/L (ref 12–45)
AST SERPL-CCNC: 19 U/L (ref 12–45)
B-HCG SERPL-ACNC: ABNORMAL MIU/ML (ref 0–5)
BACTERIA #/AREA URNS HPF: NEGATIVE /HPF
BASOPHILS # BLD AUTO: 0.3 % (ref 0–1.8)
BASOPHILS # BLD AUTO: 0.6 % (ref 0–1.8)
BASOPHILS # BLD: 0.05 K/UL (ref 0–0.12)
BASOPHILS # BLD: 0.05 K/UL (ref 0–0.12)
BILIRUB SERPL-MCNC: 0.2 MG/DL (ref 0.1–1.5)
BILIRUB SERPL-MCNC: 0.3 MG/DL (ref 0.1–1.5)
BUN SERPL-MCNC: 6 MG/DL (ref 8–22)
BUN SERPL-MCNC: 7 MG/DL (ref 8–22)
CALCIUM ALBUM COR SERPL-MCNC: 8.9 MG/DL (ref 8.5–10.5)
CALCIUM ALBUM COR SERPL-MCNC: 9 MG/DL (ref 8.5–10.5)
CALCIUM SERPL-MCNC: 9.5 MG/DL (ref 8.5–10.5)
CALCIUM SERPL-MCNC: 9.6 MG/DL (ref 8.5–10.5)
CHLORIDE SERPL-SCNC: 103 MMOL/L (ref 96–112)
CHLORIDE SERPL-SCNC: 104 MMOL/L (ref 96–112)
CO2 SERPL-SCNC: 21 MMOL/L (ref 20–33)
CO2 SERPL-SCNC: 22 MMOL/L (ref 20–33)
COLOR UR: ABNORMAL
CREAT SERPL-MCNC: 0.53 MG/DL (ref 0.5–1.4)
CREAT SERPL-MCNC: 0.6 MG/DL (ref 0.5–1.4)
EOSINOPHIL # BLD AUTO: 0.01 K/UL (ref 0–0.51)
EOSINOPHIL # BLD AUTO: 0.1 K/UL (ref 0–0.51)
EOSINOPHIL NFR BLD: 0.1 % (ref 0–6.9)
EOSINOPHIL NFR BLD: 1.1 % (ref 0–6.9)
EPI CELLS #/AREA URNS HPF: NEGATIVE /HPF
ERYTHROCYTE [DISTWIDTH] IN BLOOD BY AUTOMATED COUNT: 42.7 FL (ref 35.9–50)
ERYTHROCYTE [DISTWIDTH] IN BLOOD BY AUTOMATED COUNT: 43.2 FL (ref 35.9–50)
GFR SERPLBLD CREATININE-BSD FMLA CKD-EPI: 127 ML/MIN/1.73 M 2
GFR SERPLBLD CREATININE-BSD FMLA CKD-EPI: 131 ML/MIN/1.73 M 2
GLOBULIN SER CALC-MCNC: 2.7 G/DL (ref 1.9–3.5)
GLOBULIN SER CALC-MCNC: 2.8 G/DL (ref 1.9–3.5)
GLUCOSE SERPL-MCNC: 121 MG/DL (ref 65–99)
GLUCOSE SERPL-MCNC: 139 MG/DL (ref 65–99)
HCT VFR BLD AUTO: 31.9 % (ref 37–47)
HCT VFR BLD AUTO: 41.2 % (ref 37–47)
HGB BLD-MCNC: 10.7 G/DL (ref 12–16)
HGB BLD-MCNC: 14 G/DL (ref 12–16)
HYALINE CASTS #/AREA URNS LPF: ABNORMAL /LPF
IMM GRANULOCYTES # BLD AUTO: 0.03 K/UL (ref 0–0.11)
IMM GRANULOCYTES # BLD AUTO: 0.08 K/UL (ref 0–0.11)
IMM GRANULOCYTES NFR BLD AUTO: 0.3 % (ref 0–0.9)
IMM GRANULOCYTES NFR BLD AUTO: 0.5 % (ref 0–0.9)
LIPASE SERPL-CCNC: 42 U/L (ref 11–82)
LYMPHOCYTES # BLD AUTO: 2.2 K/UL (ref 1–4.8)
LYMPHOCYTES # BLD AUTO: 2.41 K/UL (ref 1–4.8)
LYMPHOCYTES NFR BLD: 15.3 % (ref 22–41)
LYMPHOCYTES NFR BLD: 24.2 % (ref 22–41)
MCH RBC QN AUTO: 30.1 PG (ref 27–33)
MCH RBC QN AUTO: 31 PG (ref 27–33)
MCHC RBC AUTO-ENTMCNC: 33.5 G/DL (ref 32.2–35.5)
MCHC RBC AUTO-ENTMCNC: 34 G/DL (ref 32.2–35.5)
MCV RBC AUTO: 89.9 FL (ref 81.4–97.8)
MCV RBC AUTO: 91.2 FL (ref 81.4–97.8)
MICRO URNS: ABNORMAL
MONOCYTES # BLD AUTO: 0.53 K/UL (ref 0–0.85)
MONOCYTES # BLD AUTO: 0.74 K/UL (ref 0–0.85)
MONOCYTES NFR BLD AUTO: 4.7 % (ref 0–13.4)
MONOCYTES NFR BLD AUTO: 5.8 % (ref 0–13.4)
NEUTROPHILS # BLD AUTO: 12.44 K/UL (ref 1.82–7.42)
NEUTROPHILS # BLD AUTO: 6.18 K/UL (ref 1.82–7.42)
NEUTROPHILS NFR BLD: 68 % (ref 44–72)
NEUTROPHILS NFR BLD: 79.1 % (ref 44–72)
NRBC # BLD AUTO: 0 K/UL
NRBC # BLD AUTO: 0 K/UL
NRBC BLD-RTO: 0 /100 WBC (ref 0–0.2)
NRBC BLD-RTO: 0 /100 WBC (ref 0–0.2)
NUMBER OF RH DOSES IND 8505RD: 1
PLATELET # BLD AUTO: 221 K/UL (ref 164–446)
PLATELET # BLD AUTO: 265 K/UL (ref 164–446)
PMV BLD AUTO: 10.1 FL (ref 9–12.9)
PMV BLD AUTO: 10.3 FL (ref 9–12.9)
POTASSIUM SERPL-SCNC: 3.2 MMOL/L (ref 3.6–5.5)
POTASSIUM SERPL-SCNC: 3.9 MMOL/L (ref 3.6–5.5)
PROT SERPL-MCNC: 7.4 G/DL (ref 6–8.2)
PROT SERPL-MCNC: 7.6 G/DL (ref 6–8.2)
RBC # BLD AUTO: 3.55 M/UL (ref 4.2–5.4)
RBC # BLD AUTO: 4.52 M/UL (ref 4.2–5.4)
RBC # URNS HPF: >150 /HPF
RH BLD: NORMAL
SODIUM SERPL-SCNC: 138 MMOL/L (ref 135–145)
SODIUM SERPL-SCNC: 141 MMOL/L (ref 135–145)
THC UR CFM-MCNC: >500 NG/ML
WBC # BLD AUTO: 15.7 K/UL (ref 4.8–10.8)
WBC # BLD AUTO: 9.1 K/UL (ref 4.8–10.8)
WBC #/AREA URNS HPF: ABNORMAL /HPF

## 2023-08-26 PROCEDURE — 36415 COLL VENOUS BLD VENIPUNCTURE: CPT

## 2023-08-26 PROCEDURE — 96375 TX/PRO/DX INJ NEW DRUG ADDON: CPT

## 2023-08-26 PROCEDURE — 59812 TREATMENT OF MISCARRIAGE: CPT | Performed by: OBSTETRICS & GYNECOLOGY

## 2023-08-26 PROCEDURE — 160029 HCHG SURGERY MINUTES - 1ST 30 MINS LEVEL 4: Performed by: OBSTETRICS & GYNECOLOGY

## 2023-08-26 PROCEDURE — 85025 COMPLETE CBC W/AUTO DIFF WBC: CPT | Mod: 91

## 2023-08-26 PROCEDURE — 80053 COMPREHEN METABOLIC PANEL: CPT | Mod: 91

## 2023-08-26 PROCEDURE — 84702 CHORIONIC GONADOTROPIN TEST: CPT

## 2023-08-26 PROCEDURE — 83690 ASSAY OF LIPASE: CPT

## 2023-08-26 PROCEDURE — 85025 COMPLETE CBC W/AUTO DIFF WBC: CPT

## 2023-08-26 PROCEDURE — 700101 HCHG RX REV CODE 250: Mod: UD | Performed by: ANESTHESIOLOGY

## 2023-08-26 PROCEDURE — 87086 URINE CULTURE/COLONY COUNT: CPT

## 2023-08-26 PROCEDURE — 700111 HCHG RX REV CODE 636 W/ 250 OVERRIDE (IP): Mod: JZ,UD | Performed by: EMERGENCY MEDICINE

## 2023-08-26 PROCEDURE — 96374 THER/PROPH/DIAG INJ IV PUSH: CPT

## 2023-08-26 PROCEDURE — 160048 HCHG OR STATISTICAL LEVEL 1-5: Performed by: OBSTETRICS & GYNECOLOGY

## 2023-08-26 PROCEDURE — 76801 OB US < 14 WKS SINGLE FETUS: CPT

## 2023-08-26 PROCEDURE — 80053 COMPREHEN METABOLIC PANEL: CPT

## 2023-08-26 PROCEDURE — 160002 HCHG RECOVERY MINUTES (STAT): Performed by: OBSTETRICS & GYNECOLOGY

## 2023-08-26 PROCEDURE — 160009 HCHG ANES TIME/MIN: Performed by: OBSTETRICS & GYNECOLOGY

## 2023-08-26 PROCEDURE — 160036 HCHG PACU - EA ADDL 30 MINS PHASE I: Performed by: OBSTETRICS & GYNECOLOGY

## 2023-08-26 PROCEDURE — 700105 HCHG RX REV CODE 258: Mod: UD | Performed by: ANESTHESIOLOGY

## 2023-08-26 PROCEDURE — 99291 CRITICAL CARE FIRST HOUR: CPT

## 2023-08-26 PROCEDURE — 700111 HCHG RX REV CODE 636 W/ 250 OVERRIDE (IP): Mod: JZ,UD

## 2023-08-26 PROCEDURE — 88305 TISSUE EXAM BY PATHOLOGIST: CPT

## 2023-08-26 PROCEDURE — 160035 HCHG PACU - 1ST 60 MINS PHASE I: Performed by: OBSTETRICS & GYNECOLOGY

## 2023-08-26 PROCEDURE — 81001 URINALYSIS AUTO W/SCOPE: CPT

## 2023-08-26 PROCEDURE — 86901 BLOOD TYPING SEROLOGIC RH(D): CPT

## 2023-08-26 PROCEDURE — 96365 THER/PROPH/DIAG IV INF INIT: CPT

## 2023-08-26 PROCEDURE — 700111 HCHG RX REV CODE 636 W/ 250 OVERRIDE (IP): Mod: UD | Performed by: ANESTHESIOLOGY

## 2023-08-26 PROCEDURE — 99284 EMERGENCY DEPT VISIT MOD MDM: CPT

## 2023-08-26 PROCEDURE — 99284 EMERGENCY DEPT VISIT MOD MDM: CPT | Mod: 57 | Performed by: OBSTETRICS & GYNECOLOGY

## 2023-08-26 PROCEDURE — 160041 HCHG SURGERY MINUTES - EA ADDL 1 MIN LEVEL 4: Performed by: OBSTETRICS & GYNECOLOGY

## 2023-08-26 RX ORDER — CEFAZOLIN SODIUM 1 G/3ML
INJECTION, POWDER, FOR SOLUTION INTRAMUSCULAR; INTRAVENOUS PRN
Status: DISCONTINUED | OUTPATIENT
Start: 2023-08-26 | End: 2023-08-27 | Stop reason: SURG

## 2023-08-26 RX ORDER — HYDRALAZINE HYDROCHLORIDE 20 MG/ML
5 INJECTION INTRAMUSCULAR; INTRAVENOUS
Status: DISCONTINUED | OUTPATIENT
Start: 2023-08-26 | End: 2023-08-27 | Stop reason: HOSPADM

## 2023-08-26 RX ORDER — METOCLOPRAMIDE HYDROCHLORIDE 5 MG/ML
10 INJECTION INTRAMUSCULAR; INTRAVENOUS ONCE
Status: COMPLETED | OUTPATIENT
Start: 2023-08-26 | End: 2023-08-26

## 2023-08-26 RX ORDER — MIDAZOLAM HYDROCHLORIDE 1 MG/ML
INJECTION INTRAMUSCULAR; INTRAVENOUS PRN
Status: DISCONTINUED | OUTPATIENT
Start: 2023-08-26 | End: 2023-08-27 | Stop reason: SURG

## 2023-08-26 RX ORDER — LIDOCAINE HYDROCHLORIDE 20 MG/ML
INJECTION, SOLUTION EPIDURAL; INFILTRATION; INTRACAUDAL; PERINEURAL PRN
Status: DISCONTINUED | OUTPATIENT
Start: 2023-08-26 | End: 2023-08-27 | Stop reason: SURG

## 2023-08-26 RX ORDER — LABETALOL HYDROCHLORIDE 5 MG/ML
5 INJECTION, SOLUTION INTRAVENOUS
Status: DISCONTINUED | OUTPATIENT
Start: 2023-08-26 | End: 2023-08-27 | Stop reason: HOSPADM

## 2023-08-26 RX ORDER — DOXYCYCLINE 100 MG/1
100 CAPSULE ORAL 2 TIMES DAILY
Qty: 6 CAPSULE | Refills: 0 | Status: SHIPPED | OUTPATIENT
Start: 2023-08-26 | End: 2023-08-29

## 2023-08-26 RX ORDER — MEPERIDINE HYDROCHLORIDE 25 MG/ML
6.25 INJECTION INTRAMUSCULAR; INTRAVENOUS; SUBCUTANEOUS
Status: DISCONTINUED | OUTPATIENT
Start: 2023-08-26 | End: 2023-08-27 | Stop reason: HOSPADM

## 2023-08-26 RX ORDER — HYDROMORPHONE HYDROCHLORIDE 1 MG/ML
0.4 INJECTION, SOLUTION INTRAMUSCULAR; INTRAVENOUS; SUBCUTANEOUS
Status: DISCONTINUED | OUTPATIENT
Start: 2023-08-26 | End: 2023-08-27 | Stop reason: HOSPADM

## 2023-08-26 RX ORDER — HYDROMORPHONE HYDROCHLORIDE 1 MG/ML
0.2 INJECTION, SOLUTION INTRAMUSCULAR; INTRAVENOUS; SUBCUTANEOUS
Status: DISCONTINUED | OUTPATIENT
Start: 2023-08-26 | End: 2023-08-27 | Stop reason: HOSPADM

## 2023-08-26 RX ORDER — ONDANSETRON 2 MG/ML
4 INJECTION INTRAMUSCULAR; INTRAVENOUS
Status: DISCONTINUED | OUTPATIENT
Start: 2023-08-26 | End: 2023-08-27 | Stop reason: HOSPADM

## 2023-08-26 RX ORDER — SODIUM CHLORIDE, SODIUM LACTATE, POTASSIUM CHLORIDE, CALCIUM CHLORIDE 600; 310; 30; 20 MG/100ML; MG/100ML; MG/100ML; MG/100ML
INJECTION, SOLUTION INTRAVENOUS CONTINUOUS
Status: DISCONTINUED | OUTPATIENT
Start: 2023-08-26 | End: 2023-08-27 | Stop reason: HOSPADM

## 2023-08-26 RX ORDER — HYDROMORPHONE HYDROCHLORIDE 1 MG/ML
0.1 INJECTION, SOLUTION INTRAMUSCULAR; INTRAVENOUS; SUBCUTANEOUS
Status: DISCONTINUED | OUTPATIENT
Start: 2023-08-26 | End: 2023-08-27 | Stop reason: HOSPADM

## 2023-08-26 RX ORDER — EPHEDRINE SULFATE 50 MG/ML
5 INJECTION, SOLUTION INTRAVENOUS
Status: DISCONTINUED | OUTPATIENT
Start: 2023-08-26 | End: 2023-08-27 | Stop reason: HOSPADM

## 2023-08-26 RX ORDER — DIPHENHYDRAMINE HYDROCHLORIDE 50 MG/ML
12.5 INJECTION INTRAMUSCULAR; INTRAVENOUS
Status: DISCONTINUED | OUTPATIENT
Start: 2023-08-26 | End: 2023-08-27 | Stop reason: HOSPADM

## 2023-08-26 RX ORDER — METOCLOPRAMIDE HYDROCHLORIDE 5 MG/ML
INJECTION INTRAMUSCULAR; INTRAVENOUS PRN
Status: DISCONTINUED | OUTPATIENT
Start: 2023-08-26 | End: 2023-08-27 | Stop reason: SURG

## 2023-08-26 RX ORDER — POTASSIUM CHLORIDE 7.45 MG/ML
10 INJECTION INTRAVENOUS ONCE
Status: COMPLETED | OUTPATIENT
Start: 2023-08-26 | End: 2023-08-26

## 2023-08-26 RX ORDER — ONDANSETRON 2 MG/ML
INJECTION INTRAMUSCULAR; INTRAVENOUS
Status: COMPLETED
Start: 2023-08-26 | End: 2023-08-26

## 2023-08-26 RX ORDER — DEXAMETHASONE SODIUM PHOSPHATE 4 MG/ML
INJECTION, SOLUTION INTRA-ARTICULAR; INTRALESIONAL; INTRAMUSCULAR; INTRAVENOUS; SOFT TISSUE PRN
Status: DISCONTINUED | OUTPATIENT
Start: 2023-08-26 | End: 2023-08-27 | Stop reason: SURG

## 2023-08-26 RX ORDER — ONDANSETRON 2 MG/ML
4 INJECTION INTRAMUSCULAR; INTRAVENOUS EVERY 4 HOURS PRN
Status: DISCONTINUED | OUTPATIENT
Start: 2023-08-26 | End: 2023-08-27 | Stop reason: HOSPADM

## 2023-08-26 RX ORDER — ONDANSETRON 2 MG/ML
INJECTION INTRAMUSCULAR; INTRAVENOUS PRN
Status: DISCONTINUED | OUTPATIENT
Start: 2023-08-26 | End: 2023-08-27 | Stop reason: SURG

## 2023-08-26 RX ORDER — MIDAZOLAM HYDROCHLORIDE 1 MG/ML
0.5 INJECTION INTRAMUSCULAR; INTRAVENOUS
Status: DISCONTINUED | OUTPATIENT
Start: 2023-08-26 | End: 2023-08-27 | Stop reason: HOSPADM

## 2023-08-26 RX ADMIN — SODIUM CHLORIDE, POTASSIUM CHLORIDE, SODIUM LACTATE AND CALCIUM CHLORIDE: 600; 310; 30; 20 INJECTION, SOLUTION INTRAVENOUS at 22:36

## 2023-08-26 RX ADMIN — FENTANYL CITRATE 50 MCG: 50 INJECTION, SOLUTION INTRAMUSCULAR; INTRAVENOUS at 22:59

## 2023-08-26 RX ADMIN — FENTANYL CITRATE 25 MCG: 50 INJECTION, SOLUTION INTRAMUSCULAR; INTRAVENOUS at 22:36

## 2023-08-26 RX ADMIN — DEXAMETHASONE SODIUM PHOSPHATE 4 MG: 4 INJECTION INTRA-ARTICULAR; INTRALESIONAL; INTRAMUSCULAR; INTRAVENOUS; SOFT TISSUE at 22:42

## 2023-08-26 RX ADMIN — METOCLOPRAMIDE 10 MG: 5 INJECTION, SOLUTION INTRAMUSCULAR; INTRAVENOUS at 22:36

## 2023-08-26 RX ADMIN — ONDANSETRON 4 MG: 2 INJECTION INTRAMUSCULAR; INTRAVENOUS at 16:50

## 2023-08-26 RX ADMIN — FENTANYL CITRATE 25 MCG: 50 INJECTION, SOLUTION INTRAMUSCULAR; INTRAVENOUS at 22:48

## 2023-08-26 RX ADMIN — METOCLOPRAMIDE 10 MG: 5 INJECTION, SOLUTION INTRAMUSCULAR; INTRAVENOUS at 18:36

## 2023-08-26 RX ADMIN — GLYCOPYRROLATE 0.1 MG: 0.2 INJECTION INTRAMUSCULAR; INTRAVENOUS at 22:36

## 2023-08-26 RX ADMIN — CEFAZOLIN 2 G: 1 INJECTION, POWDER, FOR SOLUTION INTRAMUSCULAR; INTRAVENOUS at 22:42

## 2023-08-26 RX ADMIN — POTASSIUM CHLORIDE 10 MEQ: 7.46 INJECTION, SOLUTION INTRAVENOUS at 17:14

## 2023-08-26 RX ADMIN — LIDOCAINE HYDROCHLORIDE 40 MG: 20 INJECTION, SOLUTION EPIDURAL; INFILTRATION; INTRACAUDAL at 22:36

## 2023-08-26 RX ADMIN — ONDANSETRON 4 MG: 2 INJECTION INTRAMUSCULAR; INTRAVENOUS at 23:02

## 2023-08-26 RX ADMIN — ONDANSETRON 4 MG: 2 INJECTION INTRAMUSCULAR; INTRAVENOUS at 15:57

## 2023-08-26 RX ADMIN — PROPOFOL 100 MG: 10 INJECTION, EMULSION INTRAVENOUS at 22:40

## 2023-08-26 RX ADMIN — MIDAZOLAM 2 MG: 1 INJECTION, SOLUTION INTRAMUSCULAR; INTRAVENOUS at 22:36

## 2023-08-26 NOTE — ED PROVIDER NOTES
Emergency Physician Note    Chief Concern:  Chief Complaint   Patient presents with    Abdominal Pain    Vaginal Bleeding     HPI/ROS   Outside Historians:   Family Member     External Records Reviewed:  Outpatient Notes Ms. Jovel was seen in OB/GYN clinic 8/15/2023.  Certified nurse midwife note reviewed from that visit.  She is G1, P0 who presented for an initial OB exam.  At that visit she was 6 weeks 1 day with an estimated date of delivery 4/8/24 by first trimester ultrasound dating.  No acute concerns identified.    HPI:  Kelly Jovel is a 28 y.o. female who is G1, P0.  She should be at 7 weeks 5 days estimated gestational age by first trimester ultrasound, however ultrasound performed earlier today demonstrated no fetal heart rate, and crown-rump length consistent with 6 weeks 0 days consistent with fetal demise.  She presented to this emergency department earlier this morning out of concern for vaginal bleeding and passing clots.  She was diagnosed with a miscarriage, and was discharged home.  She states that her bleeding continued, she continued to pass clots.  She began to feel nauseated, and tried to take a shower but had a syncopal episode in the shower, prompting her to return to the emergency department.  On arrival, heart rate was 130 with blood pressure of 99/75.  She continued to have lightheadedness and vomiting, which was treated with Zofran.  She has had ongoing cramping since early this morning, states bleeding has been persistent since that time.  She has not had any worsening bleeding, nor worsening clots, however she has continued to feel more fatigued and lightheaded, as well as nauseated as the day has progressed.  She reports no significant past medical history.  Family member at bedside states that she has been bleeding heavily since this morning.      PAST MEDICAL HISTORY  History reviewed. No pertinent past medical history.    SURGICAL HISTORY  History reviewed. No  pertinent surgical history.    FAMILY HISTORY  Family History   Problem Relation Age of Onset    No Known Problems Mother     No Known Problems Father     No Known Problems Maternal Grandmother     No Known Problems Maternal Grandfather     No Known Problems Paternal Grandmother     Glaucoma Paternal Grandfather        SOCIAL HISTORY   reports that she has never smoked. She has never used smokeless tobacco. She reports current drug use. Drug: Marijuana. She reports that she does not drink alcohol.    CURRENT MEDICATIONS  Current Discharge Medication List        CONTINUE these medications which have NOT CHANGED    Details   Prenatal MV-Min-Fe Fum-FA-DHA (PRENATAL 1 PO) Take  by mouth.      fluconazole (DIFLUCAN) 150 MG tablet Take 1 tablet once.  Repeat in 72 hours if needed  Qty: 2 Tab, Refills: 0    Associated Diagnoses: Oral candidiasis             ALLERGIES  Patient has no known allergies.    PHYSICAL EXAM  Vital Signs: BP 96/52   Pulse 98   Temp 36.1 °C (97 °F) (Temporal)   Resp 20   LMP 06/09/2023   SpO2 98%     Constitutional: Alert, no acute distress  HENT: Normocephalic, atraumatic.  Cardiovascular: Regular rhythm, tachycardic rate  Pulmonary: Breath sounds quiet and equal bilaterally, no wheezing, no coarse breath sounds  Abdomen: Soft, uncomfortable on palpation  : External genitalia is normal-appearing, no brisk bleeding from the introitus.  Patient was initially unable to tolerate speculum exam, smaller speculum was not immediately available.  Skin: Warm, dry, no rashes or lesions  Musculoskeletal: Normal range of motion in all extremities, no swelling or deformity noted  Neurologic: Alert, oriented, normal motor function, no speech deficits  Psychiatric: Normal and appropriate mood and affect    Diagnostic Studies & Procedures    Labs:  All labs reviewed by me as noted below.    Radiology:  The attending Emergency Physician has independently interpreted the following imaging:  I independently  interpreted the ultrasound imaging from the transvaginal ultrasound performed during this visit.  I do not visualize any intrauterine pregnancy.    US-OB 1ST TRIMESTER WITH TRANSVAGINAL (COMBO)   Final Result      Findings consistent with spontaneous  in progress with complex cystic structure within the cervical canal likely indicating gestational sac.  Complex fluid within the endometrial canal likely indicates clot.        I reviewed the radiology report from first trimester ultrasound performed earlier today.  Findings consistent with embryonic demise, fetal heart motion is no longer present.  Probable clot or hemorrhage noted within the endocervical canal.    Course and Medical Decision Making    ED Observation Status? Yes; Differential diagnosis includes severe or life threatening conditions including: Hemorrhagic shock.  Due to diagnostic uncertainty and therapeutic intensity at this time, patient placed in observation status at 4:30 PM, 2023.     Observation plan is as follows: Lab studies, advanced imaging, specialist consult, ongoing hemodynamic monitoring, serial reassessments    Upon Reevaluation, the patient's condition has: not improved; and will be escalated to hospitalization.    Discharged from ED observation at 10:09 PM, 2023.  Complexity: High    Initial Assessment and Plan  Care Narrative: Ms. Jovel presents to the emergency department today for evaluation of vaginal bleeding in the setting of miscarriage in first trimester pregnancy.  On arrival she is tachycardic and hypotensive, IV fluid bolus was initiated.  She also had nausea, this was treated with Zofran.  She is afebrile on arrival.  Initially, she was not able to tolerate speculum exam, smaller speculum was not immediately available.  Central supply was contacted to obtain an appropriate size speculum.  No brisk bleeding from the introitus identified.    On laboratory evaluation White blood count is 15.7, up from  9.1 earlier today, believe this is likely reactive.  Hemoglobin has dropped from 14.0-10.7, she did not receive a fluid bolus during her earlier visit, drop in hemoglobin is not dilutional due to fluid administration.  On laboratory evaluation CMP demonstrates potassium of 3.2, this was replaced via IV in the emergency department.  Prior labs are reviewed, she is Rh-, RhoGAM was administered earlier today.    Call was placed to Dr. Main, who kindly evaluated the patient in the emergency department.  Cervical os has closed, however due to abnormal vital signs on arrival, as well as significant drop in hemoglobin she ordered a repeat pelvic ultrasound to evaluate for any retained products.    Receiving IV fluid bolus heart rate has normalized, resting heart rate is now 90.  Systolic blood pressure is improved to 104/66.  Bleeding seems to have slowed.    Repeat ultrasound shows complex cystic structure within the cervical canal likely indicating gestational sac.  Images were reviewed by myself, and discussed with Dr. Main who also reviewed the imaging.  It appears as though spontaneous miscarriage is complete.  On my reassessment she feels significantly improved, is no longer having heavy bleeding, however systolic blood pressures in the 80s with heart rate in the 90s.  Dr. Main reassessed the patient after receiving the ultrasound, plan at this time is for admission for ongoing monitoring of vital signs given hypotensive and and tachycardia, with plan for D&C to be performed by OB/GYN.    Additional Problems and Disposition    I have discussed management of the patient with the following physicians and ALLY's: Dr. Main (OB/GYN).    DISPOSITION:  Patient will be hospitalized by Dr. Main in guarded condition.    FINAL IMPRESSION   1. Miscarriage    2. Hypotension, unspecified hypotension type      I, Charlie Johnson (Scribe), am scribing for, and in the presence of, Sara Beltrán,  M.D.    Electronically signed by: Charlie Johnson (Scribe), 8/26/2023    ISara M.D. personally performed the services described in this documentation, as scribed by Charlie Johnson in my presence, and it is both accurate and complete.    The note accurately reflects work and decisions made by me.  Sara Beltrán M.D.  8/26/2023  11:40 PM

## 2023-08-26 NOTE — ED TRIAGE NOTES
"Kelly Jovel  28 y.o. female    Chief Complaint   Patient presents with    Abdominal Pain    Vaginal Bleeding     Pt arrives with complaints of continued vaginal bleeding and lower pelvic cramping that has continued since earlier today. Pt states she was diagnosed with a miscarriage earlier today and now is feeling lightheaded. States she is passing \"baseball sized clots\" but only when she goes to toilet- minimal bleeding onto pads in between bathroom trips. Pt states she syncopized in shower earlier when she was feeling lightheaded. Denies hitting head.     Pt states she began vomiting today. Pt appears pale in triage. Dry heaving and vomiting.     Vitals:    08/26/23 1501   BP: 99/75   Pulse: (!) 130   Resp: 20   Temp: 36.1 °C (97 °F)   SpO2: 100%       Triage process explained to patient, apologized for wait time, and returned to lobby.  Pt informed to notify staff of any change in condition.     "

## 2023-08-26 NOTE — ED TRIAGE NOTES
Chief Complaint   Patient presents with    Vaginal Bleeding     Pt to be about 7wks pregnant, confirmed by U/S. Pt states vag bleed this am, bright red in nature with abd cramping.      Pt given urine cup/instruction.  Explained to pt triage process, made pt aware to tell this RN/staff of any changes/concerns, pt verbalized understanding of process and instructions given. Pt to ER alaina.

## 2023-08-26 NOTE — ED NOTES
Pt medicated per MAR. DC instructions reviewed with pt. Pt verbalized understanding. Pt ambulated to [facility] with steady gait.

## 2023-08-26 NOTE — ED PROVIDER NOTES
"ED Provider Note    CHIEF COMPLAINT  Chief Complaint   Patient presents with    Vaginal Bleeding     Pt to be about 7wks pregnant, confirmed by U/S. Pt states vag bleed this am, bright red in nature with abd cramping.        EXTERNAL RECORDS REVIEWED  Outpatient Notes   On August 15, 2023 the patient had an ultrasound that showed a 6-week 1 day IUP    HPI/ROS  LIMITATION TO HISTORY   Select: : None  OUTSIDE HISTORIAN(S):  The patient's mother    Kelly Jovel is a 28 y.o. female who presents history G1, P0, she had an ultrasound performed on August 15, 2023, 10 days ago that showed a 6-week 1 day IUP.  She at that visit had prenatal labs sent.  Patient presents stating that today she had vaginal bleeding with clots that began this morning.  She has had some crampy abdominal pain.    PAST MEDICAL HISTORY   G1, P0    SURGICAL HISTORY  patient denies any surgical history    FAMILY HISTORY  Family History   Problem Relation Age of Onset    No Known Problems Mother     No Known Problems Father     No Known Problems Maternal Grandmother     No Known Problems Maternal Grandfather     No Known Problems Paternal Grandmother     Glaucoma Paternal Grandfather        SOCIAL HISTORY  Social History     Tobacco Use    Smoking status: Never    Smokeless tobacco: Never   Vaping Use    Vaping Use: Never used   Substance and Sexual Activity    Alcohol use: No     Comment: Rarely     Drug use: Yes     Types: Marijuana    Sexual activity: Not Currently     Partners: Male       CURRENT MEDICATIONS  Prenatal vitamins      ALLERGIES  No Known Allergies    PHYSICAL EXAM  VITAL SIGNS: /71   Pulse 87   Temp 36.4 °C (97.6 °F) (Temporal)   Resp 18   Ht 1.499 m (4' 11\")   Wt 39 kg (85 lb 15.7 oz)   LMP 06/09/2023   SpO2 99%   BMI 17.37 kg/m²    Constitutional: Alert.  HENT: No signs of trauma, Bilateral external ears normal, Nose normal.   Eyes: Pupils are equal and reactive, Conjunctiva normal, Non-icteric.   Neck: Normal " range of motion, No tenderness, Supple, No stridor.   Lymphatic: No lymphadenopathy noted.   Cardiovascular: Regular rate and rhythm, no murmurs.   Thorax & Lungs: Normal breath sounds, No respiratory distress, No wheezing, No chest tenderness.   Abdomen: Bowel sounds normal, Soft, No tenderness, No peritoneal signs, No masses, No pulsatile masses.   Skin: Warm, Dry, No erythema, No rash.   Back: No bony tenderness, No CVA tenderness.   Extremities: Intact distal pulses, No edema, No tenderness, No cyanosis.  Musculoskeletal: Good range of motion in all major joints. No tenderness to palpation or major deformities noted.   Neurologic: Alert , Normal motor function, Normal sensory function, No focal deficits noted.   Psychiatric: Affect normal, Judgment normal, Mood normal.       DIAGNOSTIC STUDIES / PROCEDURES      LABS  Labs Reviewed   COMP METABOLIC PANEL - Abnormal; Notable for the following components:       Result Value    Glucose 121 (*)     Bun 6 (*)     All other components within normal limits   HCG QUANTITATIVE - Abnormal; Notable for the following components:    Bhcg 19437.0 (*)     All other components within normal limits   URINALYSIS,CULTURE IF INDICATED - Abnormal; Notable for the following components:    Color Red (*)     Character Cloudy (*)     All other components within normal limits    Narrative:     Indication for culture:->Pregnant women: fever and/or  asymptomatic screening   URINE MICROSCOPIC (W/UA) - Abnormal; Notable for the following components:    WBC 10-20 (*)     RBC >150 (*)     All other components within normal limits    Narrative:     Indication for culture:->Pregnant women: fever and/or  asymptomatic screening   CBC WITH DIFFERENTIAL   LIPASE   RH TYPE FOR RHOGAM FROM E.D.    Narrative:     Print Consent?->No   ESTIMATED GFR   ACTION: RH IMMUNE GLOBULIN    Narrative:     Print Consent?->No   URINE CULTURE(NEW)    Narrative:     Indication for culture:->Pregnant women: fever  and/or  asymptomatic screening         RADIOLOGY  I have independently interpreted the diagnostic imaging associated with this visit and am waiting the final reading from the radiologist.   My preliminary interpretation is as follows: No fetal heartbeat  Radiologist interpretation:     US-OB 1ST TRIMESTER SINGLE GEST Is the patient pregnant? Yes   Final Result      1.  Findings are consistent with embryonic demise. There is no increase in size of fetal pole and fetal heart motion which was present on the prior exam is no longer present.      2.  Probable clot or hemorrhage within the endocervical canal.            COURSE & MEDICAL DECISION MAKING    ED Observation Status? Yes; I am placing the patient in to an observation status due to a diagnostic uncertainty as well as therapeutic intensity. Patient placed in observation status at 9:05 AM, 8/26/2023.     Observation plan is as follows: The patient presents with vaginal bleeding during pregnancy.  Labs were ordered to assess for anemia, ultrasound was ordered.    Upon Reevaluation, the patient's condition has: Improved; and will be discharged.    Patient discharged from ED Observation status at 11:02 AM (Time) August 26, 2023 (Date).     INITIAL ASSESSMENT, COURSE AND PLAN  Care Narrative:     The patient's blood type is Rh-, her ultrasound shows fetal demise.  Patient is given RhoGAM.  [facility] and return for worsening symptoms.  I have offered her stronger pain and nausea medication than what is over-the-counter but she would not like a prescription at this time.        ADDITIONAL PROBLEM LIST  Rh- blood type  DISPOSITION AND DISCUSSIONS  I have discussed management of the patient with the following physicians and ALLY's:  none    Discussion of management with other QHP or appropriate source(s): None     Escalation of care considered, and ultimately not performed:acute inpatient care management, however at this time, the patient is  most appropriate for outpatient management    Barriers to care at this time, including but not limited to:  None .     Decision tools and prescription drugs considered including, but not limited to:  Patient given RhoGAM for Rh- blood type with vaginal bleeding in pregnancy .       The patient will return for new or worsening symptoms and is stable at the time of discharge.    The patient is referred to a primary physician for blood pressure management, diabetic screening, and for all other preventative health concerns.        DISPOSITION:  Patient will be discharged home in stable condition.    FOLLOW UP:  [facility], Emergency Dept  [address] [address] [phone]  Follow up  If symptoms worsen    [facility]  [address]  [facility] [place] [id number] [phone]  Follow up        OUTPATIENT MEDICATIONS:  New Prescriptions    No medications on file         FINAL DIAGNOSIS  1. Miscarriage    2. Blood type, Rh negative           Electronically signed by: Tyson Mercedes M.D., 8/26/2023 9:05 AM

## 2023-08-27 VITALS
HEART RATE: 75 BPM | SYSTOLIC BLOOD PRESSURE: 107 MMHG | TEMPERATURE: 98.1 F | RESPIRATION RATE: 20 BRPM | DIASTOLIC BLOOD PRESSURE: 58 MMHG | OXYGEN SATURATION: 98 %

## 2023-08-27 RX ORDER — SODIUM CHLORIDE, SODIUM LACTATE, POTASSIUM CHLORIDE, CALCIUM CHLORIDE 600; 310; 30; 20 MG/100ML; MG/100ML; MG/100ML; MG/100ML
INJECTION, SOLUTION INTRAVENOUS
Status: DISCONTINUED | OUTPATIENT
Start: 2023-08-26 | End: 2023-08-27 | Stop reason: SURG

## 2023-08-27 ASSESSMENT — PAIN SCALES - GENERAL: PAIN_LEVEL: 0

## 2023-08-27 NOTE — ANESTHESIA POSTPROCEDURE EVALUATION
Patient: Kelly Jovel    Procedure Summary     Date: 23 Room / Location: [place] [address] / SURGERY [facility]    Anesthesia Start:  Anesthesia Stop:     Procedure: DILATION AND CURETTAGE (Uterus) Diagnosis: (spontaneous  with retained products of conception)    Surgeons: Sabine Main M.D. Responsible Provider: Nikolas Rico M.D.    Anesthesia Type: general ASA Status: 1 - Emergent          Final Anesthesia Type: general  Last vitals  BP   Blood Pressure: 107/58    Temp   36.7 °C (98.1 °F)    Pulse   75   Resp   20    SpO2   98 %      Anesthesia Post Evaluation    Patient location during evaluation: PACU  Patient participation: complete - patient participated  Level of consciousness: awake and alert  Pain score: 0    Airway patency: patent  Anesthetic complications: no  Cardiovascular status: hemodynamically stable  Respiratory status: acceptable  Hydration status: euvolemic    PONV: none          No notable events documented.     Nurse Pain Score: 0 (NPRS)

## 2023-08-27 NOTE — H&P
Chief Complaint   Patient presents with    Abdominal Pain    Vaginal Bleeding      Kelly Jovel is a 28 y.o. female  presents with spontaneous . She was seen earlier this morning in ER with vaginal bleeding and diagnosed with missed  with fetus 6 weeks without cardiac activity. She stated after she went home she had increased bleeding and cramping and passed large clots and what she believes was tissue. She returned after having a syncopal event in the shower.  But she states her cramping and bleeding has decreased significantly now. And after IV fluids states she feels much better.      Review of systems:  Pertinent positives documented in HPI and a comprehensive review of system is negative as follows:     Gyn history:   No STI,     Past Medical History   History reviewed. No pertinent past medical history.     Past Surgical History   History reviewed. No pertinent surgical history.                          OB History    Para Term  AB Living   1             SAB IAB Ectopic Molar Multiple Live Births                      # Outcome Date GA Lbr Trav/2nd Weight Sex Delivery Anes PTL Lv   1 Current                           Allergies: Patient has no known allergies.     Current Facility-Administered Medications:     ondansetron (Zofran) syringe/vial injection 4 mg, 4 mg, Intravenous, Q4HRS PRN, Sara Beltrán M.D., 4 mg at 23 1650     Current Outpatient Medications:     Prenatal MV-Min-Fe Fum-FA-DHA (PRENATAL 1 PO), Take  by mouth., Disp: , Rfl:     fluconazole (DIFLUCAN) 150 MG tablet, Take 1 tablet once.  Repeat in 72 hours if needed, Disp: 2 Tab, Rfl: 0     Pt with following problems:       Patient Active Problem List     Diagnosis Date Noted    Rh negative state in antepartum period 2023    Encounter for supervision of normal first pregnancy, first trimester 08/15/2023            Objective:      /53   Pulse 76   Temp 36.1 °C (97 °F) (Temporal)   Resp  20   SpO2 98%      General:   no acute distress, appears fatigued         HEENT:  EOMI   Lungs:   normal respiratory effort   Heart:   RRR   Pelvic SSE: speculum inserted, small clot in vault removed with sponge stick, no active bleeding from os, cervix appears closed    SVE: Cx closed, uterus small, nontender    Abdomen:   BS positive, ND, soft, NT x 4   EXT:  NT, no edema      Lab Review  Lab:     Recent Results         Recent Results (from the past 2016 hour(s))   Chlamydia/GC, PCR (Urine)     Collection Time: 08/22/23  1:04 PM     Specimen: Urine   Result Value Ref Range     C. trachomatis by PCR Negative Negative     Gc By Dna Probe Negative Negative     Source Urine     PREG CNTR PRENATAL PN     Collection Time: 08/22/23  1:04 PM   Result Value Ref Range     WBC 8.7 4.8 - 10.8 K/uL     RBC 4.20 4.20 - 5.40 M/uL     Hemoglobin 13.1 12.0 - 16.0 g/dL     Hematocrit 38.8 37.0 - 47.0 %     MCV 92.4 81.4 - 97.8 fL     MCH 31.2 27.0 - 33.0 pg     MCHC 33.8 32.2 - 35.5 g/dL     RDW 45.3 35.9 - 50.0 fL     Platelet Count 206 164 - 446 K/uL     MPV 11.4 9.0 - 12.9 fL     Hepatitis C Antibody Non-Reactive Non-Reactive     Hepatitis B Surface Antigen Non-Reactive Non-Reactive     Rubella IgG Antibody 169.00 IU/mL     Syphilis, Treponemal Qual Non-Reactive Non-Reactive   URINE DRUG SCREEN W/CONF (AR)     Collection Time: 08/22/23  1:04 PM   Result Value Ref Range     Urine Amphetamine-Methamphetam Negative Cutoff 300 ng/mL     Barbiturates Negative Cutoff 200 ng/mL     Benzodiazepines Negative Cutoff 200 ng/mL     Propoxyphene Negative Cutoff 300 ng/mL     Cocaine Metabolite Negative Cutoff 150 ng/mL     Methadone Negative Cutoff 150 ng/mL     Codeine-Morphine Negative Cutoff 300 ng/mL     Phencyclidine -Pcp Negative Cutoff 25 ng/mL     Cannabinoid Metab Positive Cutoff 50 ng/mL     Drug Comment Urine Drugs See Note     URINE CULTURE(NEW)     Collection Time: 08/22/23  1:04 PM     Specimen: Urine   Result Value Ref Range      Significant Indicator NEG       Source UR       Site -       Culture Result Usual urogenital wang >100,000 cfu/mL     HIV AG/AB COMBO ASSAY SCREENING     Collection Time: 08/22/23  1:04 PM   Result Value Ref Range     HIV Ag/Ab Combo Assay Non-Reactive Non Reactive   OP Prenatal Panel-Blood Bank     Collection Time: 08/22/23  1:04 PM   Result Value Ref Range     ABO Grouping Only O       Rh Grouping Only NEG       Antibody Screen Scrn NEG     CANNABINOIDS, UR CONFIRM     Collection Time: 08/22/23  1:04 PM   Result Value Ref Range     Cannabinoids, Ur Drug Confirmation >500 ng/mL   CBC WITH DIFFERENTIAL     Collection Time: 08/26/23  8:37 AM   Result Value Ref Range     WBC 9.1 4.8 - 10.8 K/uL     RBC 4.52 4.20 - 5.40 M/uL     Hemoglobin 14.0 12.0 - 16.0 g/dL     Hematocrit 41.2 37.0 - 47.0 %     MCV 91.2 81.4 - 97.8 fL     MCH 31.0 27.0 - 33.0 pg     MCHC 34.0 32.2 - 35.5 g/dL     RDW 43.2 35.9 - 50.0 fL     Platelet Count 221 164 - 446 K/uL     MPV 10.3 9.0 - 12.9 fL     Neutrophils-Polys 68.00 44.00 - 72.00 %     Lymphocytes 24.20 22.00 - 41.00 %     Monocytes 5.80 0.00 - 13.40 %     Eosinophils 1.10 0.00 - 6.90 %     Basophils 0.60 0.00 - 1.80 %     Immature Granulocytes 0.30 0.00 - 0.90 %     Nucleated RBC 0.00 0.00 - 0.20 /100 WBC     Neutrophils (Absolute) 6.18 1.82 - 7.42 K/uL     Lymphs (Absolute) 2.20 1.00 - 4.80 K/uL     Monos (Absolute) 0.53 0.00 - 0.85 K/uL     Eos (Absolute) 0.10 0.00 - 0.51 K/uL     Baso (Absolute) 0.05 0.00 - 0.12 K/uL     Immature Granulocytes (abs) 0.03 0.00 - 0.11 K/uL     NRBC (Absolute) 0.00 K/uL   COMP METABOLIC PANEL     Collection Time: 08/26/23  8:37 AM   Result Value Ref Range     Sodium 138 135 - 145 mmol/L     Potassium 3.9 3.6 - 5.5 mmol/L     Chloride 103 96 - 112 mmol/L     Co2 22 20 - 33 mmol/L     Anion Gap 13.0 7.0 - 16.0     Glucose 121 (H) 65 - 99 mg/dL     Bun 6 (L) 8 - 22 mg/dL     Creatinine 0.53 0.50 - 1.40 mg/dL     Calcium 9.5 8.5 - 10.5 mg/dL      Correct Calcium 8.9 8.5 - 10.5 mg/dL     AST(SGOT) 19 12 - 45 U/L     ALT(SGPT) 14 2 - 50 U/L     Alkaline Phosphatase 57 30 - 99 U/L     Total Bilirubin 0.2 0.1 - 1.5 mg/dL     Albumin 4.8 3.2 - 4.9 g/dL     Total Protein 7.6 6.0 - 8.2 g/dL     Globulin 2.8 1.9 - 3.5 g/dL     A-G Ratio 1.7 g/dL   LIPASE     Collection Time: 08/26/23  8:37 AM   Result Value Ref Range     Lipase 42 11 - 82 U/L   HCG QUANTITATIVE     Collection Time: 08/26/23  8:37 AM   Result Value Ref Range     Bhcg 91114.0 (H) 0.0 - 5.0 mIU/mL   RH Type for Rhogam from E.D.     Collection Time: 08/26/23  8:37 AM   Result Value Ref Range     Emergency Department Rh Typing NEG       Number Of Rh Doses Indicated 1     ACTION: RH IMMUNE GLOBULIN     Collection Time: 08/26/23  8:37 AM   Result Value Ref Range     Action  Rh Immune Globulin [id number]       issued       1 Syrng     ESTIMATED GFR     Collection Time: 08/26/23  8:37 AM   Result Value Ref Range     GFR (CKD-EPI) 131 >60 mL/min/1.73 m 2   URINALYSIS,CULTURE IF INDICATED     Collection Time: 08/26/23  9:45 AM     Specimen: Urine   Result Value Ref Range     Color Red (A)       Character Cloudy (A)       Micro Urine Req Microscopic     URINE MICROSCOPIC (W/UA)     Collection Time: 08/26/23  9:45 AM   Result Value Ref Range     WBC 10-20 (A) /hpf     RBC >150 (A) /hpf     Bacteria Negative None /hpf     Epithelial Cells Negative /hpf     Hyaline Cast 0-2 /lpf   CBC WITH DIFFERENTIAL     Collection Time: 08/26/23  3:43 PM   Result Value Ref Range     WBC 15.7 (H) 4.8 - 10.8 K/uL     RBC 3.55 (L) 4.20 - 5.40 M/uL     Hemoglobin 10.7 (L) 12.0 - 16.0 g/dL     Hematocrit 31.9 (L) 37.0 - 47.0 %     MCV 89.9 81.4 - 97.8 fL     MCH 30.1 27.0 - 33.0 pg     MCHC 33.5 32.2 - 35.5 g/dL     RDW 42.7 35.9 - 50.0 fL     Platelet Count 265 164 - 446 K/uL     MPV 10.1 9.0 - 12.9 fL     Neutrophils-Polys 79.10 (H) 44.00 - 72.00 %     Lymphocytes 15.30 (L) 22.00 - 41.00 %     Monocytes 4.70 0.00 - 13.40 %      Eosinophils 0.10 0.00 - 6.90 %     Basophils 0.30 0.00 - 1.80 %     Immature Granulocytes 0.50 0.00 - 0.90 %     Nucleated RBC 0.00 0.00 - 0.20 /100 WBC     Neutrophils (Absolute) 12.44 (H) 1.82 - 7.42 K/uL     Lymphs (Absolute) 2.41 1.00 - 4.80 K/uL     Monos (Absolute) 0.74 0.00 - 0.85 K/uL     Eos (Absolute) 0.01 0.00 - 0.51 K/uL     Baso (Absolute) 0.05 0.00 - 0.12 K/uL     Immature Granulocytes (abs) 0.08 0.00 - 0.11 K/uL     NRBC (Absolute) 0.00 K/uL   COMP METABOLIC PANEL     Collection Time: 23  3:43 PM   Result Value Ref Range     Sodium 141 135 - 145 mmol/L     Potassium 3.2 (L) 3.6 - 5.5 mmol/L     Chloride 104 96 - 112 mmol/L     Co2 21 20 - 33 mmol/L     Anion Gap 16.0 7.0 - 16.0     Glucose 139 (H) 65 - 99 mg/dL     Bun 7 (L) 8 - 22 mg/dL     Creatinine 0.60 0.50 - 1.40 mg/dL     Calcium 9.6 8.5 - 10.5 mg/dL     Correct Calcium 9.0 8.5 - 10.5 mg/dL     AST(SGOT) 17 12 - 45 U/L     ALT(SGPT) 13 2 - 50 U/L     Alkaline Phosphatase 52 30 - 99 U/L     Total Bilirubin 0.3 0.1 - 1.5 mg/dL     Albumin 4.7 3.2 - 4.9 g/dL     Total Protein 7.4 6.0 - 8.2 g/dL     Globulin 2.7 1.9 - 3.5 g/dL     A-G Ratio 1.7 g/dL   ESTIMATED GFR     Collection Time: 23  3:43 PM   Result Value Ref Range     GFR (CKD-EPI) 127 >60 mL/min/1.73 m 2            Assessment:   28 y.o.  here with SAB  Suspect completed based on exam  Drop in Hgb from this am  Plan:   Pelvic US to rule out retained POCs secondary to change in Hgb from this am. If no retained POCs can be discharged home.         Addendum:  Pt feeling well with minimal bleeding and only mild cramping but repeat pelvic US shows persistent POCs in the endocervical canal. She does have lower BP but is no longer tachycardic. Options discussed with her including cytotec and overnight observation versus D&C. At this time she is concerned about further bleeding and desires to have D&C to be sure all products have been removed.   R/B/A to D&C are discussed in detail  including risks of anesthesia, risks of hemorrhage requiring transfusion and she verbally consents to blood transfusion in an emergent situation, risks of injury to other intraabdominal and pelvic organs including uterine perforation requiring laparoscopy or laparotomy and repair of damage, risks of injury to bladder, bowel, blood vessels, and nerves all discussed. Post operative risks of bleeding, infection are all discussed. All ?s answered. Will proceed with D&C for retained POCs after SAB.

## 2023-08-27 NOTE — ANESTHESIA PREPROCEDURE EVALUATION
Case: [id number] Date/Time: 08/26/23 2230    Procedure: DILATION AND CURETTAGE    Location: [place] [address] / SURGERY [facility]    Surgeons: GARRETT Zepeda H&P:  PAST MEDICAL HISTORY:   28 y.o. female who presents for Procedure(s):  DILATION AND CURETTAGE.  She has current and past medical problems significant for:    Patient denies history of previous MI, chest pain or shortness of breath  Able to climb 2 flights of stair without dyspnea or angina, > 4 METs     Patient denies history of complications with anesthesia    History reviewed. No pertinent past medical history.    SMOKING/ALCOHOL/RECREATIONAL DRUG USE:  Social History     Tobacco Use   • Smoking status: Never   • Smokeless tobacco: Never   Vaping Use   • Vaping Use: Never used   Substance Use Topics   • Alcohol use: No     Comment: Rarely    • Drug use: Yes     Types: Marijuana     Social History     Substance and Sexual Activity   Drug Use Yes   • Types: Marijuana       PAST SURGICAL HISTORY:  History reviewed. No pertinent surgical history.    ALLERGIES:   No Known Allergies    MEDICATIONS:  No current facility-administered medications on file prior to encounter.     Current Outpatient Medications on File Prior to Encounter   Medication Sig Dispense Refill   • Prenatal MV-Min-Fe Fum-FA-DHA (PRENATAL 1 PO) Take  by mouth.     • fluconazole (DIFLUCAN) 150 MG tablet Take 1 tablet once.  Repeat in 72 hours if needed 2 Tab 0       LABS:  Recent Labs     08/26/23  0837 08/26/23  1543   WBC 9.1 15.7*   RBC 4.52 3.55*   HEMOGLOBIN 14.0 10.7*   HEMATOCRIT 41.2 31.9*   MCV 91.2 89.9   MCH 31.0 30.1   RDW 43.2 42.7   PLATELETCT 221 265   MPV 10.3 10.1   NEUTSPOLYS 68.00 79.10*   LYMPHOCYTES 24.20 15.30*   MONOCYTES 5.80 4.70   EOSINOPHILS 1.10 0.10   BASOPHILS 0.60 0.30      Lab Results   Component Value Date/Time    SODIUM 141 08/26/2023 1543    POTASSIUM 3.2 (L) 08/26/2023 1543    CHLORIDE 104 08/26/2023 1543    CO2 21 08/26/2023 1543    GLUCOSE  139 (H) 08/26/2023 1543    BUN 7 (L) 08/26/2023 1543    CALCIUM 9.6 08/26/2023 1543         PREVIOUS ANESTHETICS: See EMR  __________________________________________    Relevant Problems   No relevant active problems       Physical Exam    Airway   Mallampati: II  TM distance: >3 FB  Neck ROM: full       Cardiovascular - normal exam  Rhythm: regular  Rate: normal  (-) murmur     Dental - normal exam           Pulmonary - normal exam  Breath sounds clear to auscultation     Abdominal    Neurological - normal exam                 Anesthesia Plan    ASA 1- EMERGENT   ASA physical status emergent criteria: acute hemorrhage    Plan - general       Airway plan will be LMA          Induction: intravenous    Postoperative Plan: Postoperative administration of opioids is intended.    Pertinent diagnostic labs and testing reviewed    Informed Consent:    Anesthetic plan and risks discussed with patient.    Use of blood products discussed with: patient whom consented to blood products.

## 2023-08-27 NOTE — ED NOTES
Bedside report received from JOSE Silva. Assumed care of patient and she is resting, family at bedside. Bed locked and in lowest position. Call light available and within reach. No oxygen requirements at this time. Appropriate fall precautions in place. Patient connected to automatic BP and pulse ox. Patient denies and needs at this time. No distress noted.

## 2023-08-27 NOTE — OP REPORT
Date of Operation: 23    Surgeon: Sabine Main MD    Preoperative diagnosis: Incomplete spontaneous  with retained products of conception    Postoperative diagnosis: same    Procedure: Suction dilation and curettage    Anesthesia: general    Anesthesiologist: Nikolas Rico MD    IV fluids: per anesthesia report    EBL: <100cc    Urine output: voided pre-op     Findings: Normal appearing labial and external genitalia. Uterus 8 cm, mid position, mobile, no adnexal masses.    Specimen: products of conception, endometrial curettings.    Complications: none    Disposition: stable    Indications for procedure: Ms. Kelly Jovel is a 28 y.o.  with a 6 week gestational age intrauterine pregnancy with a spontaneous .   Surgical risks: The patient was informed of risks and benefits of procedure. Risks included by were not limited to bleeding, infection, injury to vulva/vagina/cervix, and uterine perforation. The patient expressed understanding to all risks involved and all questions were answered. The patient consented to the procedure.    Description of the Procedure:  The patient was taken to the operating room where a time out was performed. She was given general LMA anesthesia.  The patient was positioned on the operating table in the dorsolithotomy position with legs supported using stirrups.  All pressure points were padded and a bear hugger was placed to maintain control of core body temperature.  A bimanual exam was performed and the uterus was found to be approximately 6 weeks in size, midposition.  The cervix was also noted to be minimally dilated.   A bivalve speculum was inserted into the vagina and the cervix was visualized and grasped using a single-tooth tenaculum at the anterior lip.  The uterus was sounded and found to be 8 centimeters.  The cervix was then adequately dilated using the Hegar dilators for the introduction of the 7 millimeter straight suction curette.   The suction curette was advanced to the fundus gently then suction was applied and the curette was rotated in a circular fashion as the curette was withdrawn.  The suction was relieved at the internal loss and the curette was again advanced to the fundus.  This was repeated several times with visible small amount of POCS seen in the tube.  When no further products were obtained the suction curette was withdrawn, and a sharp curette was advanced to the fundus.  The uterus was curetted in a systematic manner covering all surfaces until good cry was noted throughout.  The suction curette was introduced one final time and the uterine cavity was cleared of any remaining products or curettings.  The suction curette was then withdrawn and a single-tooth tenaculum was removed from the anterior lip.  Good hemostasis was noted.  The bivalve speculum was then removed from the vagina.  At the completion of the procedure all sponge needle and instrument counts were noted to be correct x2.  The patient tolerated procedure well and was transferred to the recovery room room in stable condition.    ---------------------------------------------------------------------------    Sabine Main MD

## 2023-08-27 NOTE — ED NOTES
Pt resting, denies lightheadedness or dizziness. ERP updated on trending hypotension. BP cuff repositioned.

## 2023-08-27 NOTE — DISCHARGE INSTRUCTIONS
If any questions arise, call your provider.  If your provider is not available, please feel free to call the Surgical Center at [phone].    MEDICATIONS: Resume taking daily medication.  Take prescribed pain medication with food.  If no medication is prescribed, you may take non-aspirin pain medication if needed.  PAIN MEDICATION CAN BE VERY CONSTIPATING.  Take a stool softener or laxative such as senokot, pericolace, or milk of magnesia if needed.    What to Expect Post Anesthesia    Rest and take it easy for the first 24 hours.  A responsible adult is recommended to remain with you during that time.  It is normal to feel sleepy.  We encourage you to not do anything that requires balance, judgment or coordination.    FOR 24 HOURS DO NOT:  Drive, operate machinery or run household appliances.  Drink beer or alcoholic beverages.  Make important decisions or sign legal documents.    To avoid nausea, slowly advance diet as tolerated, avoiding spicy or greasy foods for the first day.  Add more substantial food to your diet according to your provider's instructions.  Babies can be fed formula or breast milk as soon as they are hungry.  INCREASE FLUIDS AND FIBER TO AVOID CONSTIPATION.    MILD FLU-LIKE SYMPTOMS ARE NORMAL.  YOU MAY EXPERIENCE GENERALIZED MUSCLE ACHES, THROAT IRRITATION, HEADACHE AND/OR SOME NAUSEA.

## 2023-08-27 NOTE — ANESTHESIA TIME REPORT
Anesthesia Start and Stop Event Times     Date Time Event    8/26/2023 2232 Ready for Procedure     2236 Anesthesia Start     2319 Anesthesia Stop        Responsible Staff  08/26/23    Name Role Begin End    Nikolas Rico M.D. Anesth 2236 2310        Overtime Reason:  no overtime (within assigned shift)    Comments:

## 2023-08-27 NOTE — ANESTHESIA PROCEDURE NOTES
Airway    Date/Time: 8/26/2023 10:41 PM    Performed by: Nikolas Rico M.D.  Authorized by: Nikolas Rico M.D.    Location:  OR  Urgency:  Elective  Indications for Airway Management:  Anesthesia      Spontaneous Ventilation: absent    Sedation Level:  Deep  Preoxygenated: Yes    Final Airway Type:  Supraglottic airway  Final Supraglottic Airway:  Standard LMA    SGA Size:  3  Number of Attempts at Approach:  1

## 2023-08-28 LAB
BACTERIA UR CULT: NORMAL
PATHOLOGY CONSULT NOTE: NORMAL
SIGNIFICANT IND 70042: NORMAL
SITE SITE: NORMAL
SOURCE SOURCE: NORMAL

## 2023-08-30 ENCOUNTER — APPOINTMENT (OUTPATIENT)
Dept: OBGYN | Facility: CLINIC | Age: 28
End: 2023-08-30
Payer: MEDICAID

## 2023-09-12 ENCOUNTER — APPOINTMENT (OUTPATIENT)
Dept: OBGYN | Facility: CLINIC | Age: 28
End: 2023-09-12
Payer: MEDICAID

## 2024-04-05 ENCOUNTER — HOSPITAL ENCOUNTER (OUTPATIENT)
Dept: LAB | Facility: MEDICAL CENTER | Age: 29
End: 2024-04-05
Attending: STUDENT IN AN ORGANIZED HEALTH CARE EDUCATION/TRAINING PROGRAM
Payer: MEDICAID

## 2024-04-05 LAB
BASOPHILS # BLD AUTO: 0.8 % (ref 0–1.8)
BASOPHILS # BLD: 0.05 K/UL (ref 0–0.12)
EOSINOPHIL # BLD AUTO: 0.08 K/UL (ref 0–0.51)
EOSINOPHIL NFR BLD: 1.3 % (ref 0–6.9)
ERYTHROCYTE [DISTWIDTH] IN BLOOD BY AUTOMATED COUNT: 45.1 FL (ref 35.9–50)
HCT VFR BLD AUTO: 43.9 % (ref 37–47)
HGB BLD-MCNC: 15.2 G/DL (ref 12–16)
IMM GRANULOCYTES # BLD AUTO: 0.01 K/UL (ref 0–0.11)
IMM GRANULOCYTES NFR BLD AUTO: 0.2 % (ref 0–0.9)
LYMPHOCYTES # BLD AUTO: 2.32 K/UL (ref 1–4.8)
LYMPHOCYTES NFR BLD: 36.4 % (ref 22–41)
MCH RBC QN AUTO: 30.6 PG (ref 27–33)
MCHC RBC AUTO-ENTMCNC: 34.6 G/DL (ref 32.2–35.5)
MCV RBC AUTO: 88.5 FL (ref 81.4–97.8)
MONOCYTES # BLD AUTO: 0.46 K/UL (ref 0–0.85)
MONOCYTES NFR BLD AUTO: 7.2 % (ref 0–13.4)
NEUTROPHILS # BLD AUTO: 3.46 K/UL (ref 1.82–7.42)
NEUTROPHILS NFR BLD: 54.1 % (ref 44–72)
NRBC # BLD AUTO: 0 K/UL
NRBC BLD-RTO: 0 /100 WBC (ref 0–0.2)
PLATELET # BLD AUTO: 230 K/UL (ref 164–446)
PMV BLD AUTO: 11.3 FL (ref 9–12.9)
RBC # BLD AUTO: 4.96 M/UL (ref 4.2–5.4)
WBC # BLD AUTO: 6.4 K/UL (ref 4.8–10.8)

## 2024-04-05 PROCEDURE — 84443 ASSAY THYROID STIM HORMONE: CPT

## 2024-04-05 PROCEDURE — 80053 COMPREHEN METABOLIC PANEL: CPT

## 2024-04-05 PROCEDURE — 36415 COLL VENOUS BLD VENIPUNCTURE: CPT

## 2024-04-05 PROCEDURE — 85025 COMPLETE CBC W/AUTO DIFF WBC: CPT

## 2024-04-05 PROCEDURE — 87522 HEPATITIS C REVRS TRNSCRPJ: CPT

## 2024-04-06 LAB
ALBUMIN SERPL BCP-MCNC: 4.8 G/DL (ref 3.2–4.9)
ALBUMIN/GLOB SERPL: 1.5 G/DL
ALP SERPL-CCNC: 60 U/L (ref 30–99)
ALT SERPL-CCNC: 13 U/L (ref 2–50)
ANION GAP SERPL CALC-SCNC: 15 MMOL/L (ref 7–16)
AST SERPL-CCNC: 19 U/L (ref 12–45)
BILIRUB SERPL-MCNC: 0.4 MG/DL (ref 0.1–1.5)
BUN SERPL-MCNC: 8 MG/DL (ref 8–22)
CALCIUM ALBUM COR SERPL-MCNC: 9.2 MG/DL (ref 8.5–10.5)
CALCIUM SERPL-MCNC: 9.8 MG/DL (ref 8.5–10.5)
CHLORIDE SERPL-SCNC: 102 MMOL/L (ref 96–112)
CO2 SERPL-SCNC: 22 MMOL/L (ref 20–33)
CREAT SERPL-MCNC: 0.67 MG/DL (ref 0.5–1.4)
GFR SERPLBLD CREATININE-BSD FMLA CKD-EPI: 123 ML/MIN/1.73 M 2
GLOBULIN SER CALC-MCNC: 3.1 G/DL (ref 1.9–3.5)
GLUCOSE SERPL-MCNC: 76 MG/DL (ref 65–99)
POTASSIUM SERPL-SCNC: 4.3 MMOL/L (ref 3.6–5.5)
PROT SERPL-MCNC: 7.9 G/DL (ref 6–8.2)
SODIUM SERPL-SCNC: 139 MMOL/L (ref 135–145)
TSH SERPL DL<=0.005 MIU/L-ACNC: 1.16 UIU/ML (ref 0.38–5.33)

## 2024-04-08 LAB
HCV RNA SERPL NAA+PROBE-ACNC: NOT DETECTED IU/ML
HCV RNA SERPL NAA+PROBE-LOG IU: NOT DETECTED LOG IU/ML
HCV RNA SERPL QL NAA+PROBE: NOT DETECTED

## 2025-05-29 ENCOUNTER — OFFICE VISIT (OUTPATIENT)
Dept: URGENT CARE | Facility: PHYSICIAN GROUP | Age: 30
End: 2025-05-29
Payer: MEDICAID

## 2025-05-29 VITALS
HEART RATE: 80 BPM | SYSTOLIC BLOOD PRESSURE: 104 MMHG | RESPIRATION RATE: 14 BRPM | OXYGEN SATURATION: 95 % | TEMPERATURE: 98.3 F | WEIGHT: 85 LBS | HEIGHT: 59 IN | DIASTOLIC BLOOD PRESSURE: 60 MMHG | BODY MASS INDEX: 17.14 KG/M2

## 2025-05-29 DIAGNOSIS — J02.9 SORE THROAT: Primary | ICD-10-CM

## 2025-05-29 LAB
FLUAV RNA SPEC QL NAA+PROBE: NEGATIVE
FLUBV RNA SPEC QL NAA+PROBE: NEGATIVE
RSV RNA SPEC QL NAA+PROBE: NEGATIVE
S PYO DNA SPEC NAA+PROBE: NOT DETECTED
SARS-COV-2 RNA RESP QL NAA+PROBE: NEGATIVE

## 2025-05-29 ASSESSMENT — FIBROSIS 4 INDEX: FIB4 SCORE: 0.62

## 2025-05-30 NOTE — PROGRESS NOTES
Verbal consent was acquired by the patient to use [employer] ambient listening note generation during this visit          Chief Complaint   Patient presents with    Pharyngitis     X 2 days , R side swollen glands, hurts to talk, ear ache           History of Present Illness  The patient is a 29-year-old female who presents for evaluation of right-sided throat pain.    She reports experiencing significant discomfort on the right side of her throat, which she attributes to inflammation. This discomfort has escalated to the point where it impedes her ability to swallow and speak. She also notes a sensation of puffiness in the affected area, accompanied by mild pain upon palpation. The onset of these symptoms was yesterday morning, and they have progressively worsened since then, disrupting her sleep due to the associated earache. She has been managing the pain with heat application and ibuprofen, which have provided some relief. She does not frequently fall ill and maintains good personal hygiene. She recently traveled to [place] and suspects potential exposure to an infectious agent at the airport. Additionally, she reports a dry, irritated cough that exacerbates her difficulty in swallowing. She also mentions a random soreness in her right calf, although she is uncertain if this is related to her current condition.         ROS:    No severe shortness of breath   No cardiac like chest pain, as discussed   As otherwise stated in HPI    Medical/SX/ Social History:  Reviewed per chart    Pertinent Medications:    Medications Ordered Prior to Encounter[1]     Allergies:    Patient has no known allergies.     Problem list, medications, and allergies reviewed by myself today in Epic     Physical Exam:    Vitals:    05/29/25 1325   BP: 104/60   Pulse: 80   Resp: 14   Temp: 36.8 °C (98.3 °F)   SpO2: 95%             Physical Exam  Vitals and nursing note reviewed.   Constitutional:       General: She is not in acute  distress.     Appearance: Normal appearance. She is ill-appearing. She is not toxic-appearing.   HENT:      Head: Normocephalic and atraumatic.      Right Ear: Tympanic membrane, ear canal and external ear normal.      Left Ear: Tympanic membrane, ear canal and external ear normal.      Nose: Nose normal.      Mouth/Throat:      Lips: Pink.      Mouth: Mucous membranes are moist.      Pharynx: Uvula midline. Pharyngeal swelling and posterior oropharyngeal erythema present. No oropharyngeal exudate or uvula swelling.      Tonsils: No tonsillar exudate. 1+ on the right.   Eyes:      Extraocular Movements: Extraocular movements intact.      Conjunctiva/sclera: Conjunctivae normal.      Pupils: Pupils are equal, round, and reactive to light.   Cardiovascular:      Rate and Rhythm: Normal rate and regular rhythm.      Pulses: Normal pulses.      Heart sounds: Normal heart sounds.   Pulmonary:      Effort: Pulmonary effort is normal.      Breath sounds: Normal breath sounds.   Musculoskeletal:         General: Normal range of motion.      Cervical back: Normal range of motion and neck supple. Tenderness present.   Lymphadenopathy:      Cervical: Cervical adenopathy present.   Skin:     General: Skin is warm.      Capillary Refill: Capillary refill takes less than 2 seconds.   Neurological:      General: No focal deficit present.      Mental Status: She is alert and oriented to person, place, and time.            Diagnostics:    Results for orders placed or performed in visit on 05/29/25   POCT GROUP A STREP, PCR    Collection Time: 05/29/25  2:38 PM   Result Value Ref Range    POC Group A Strep, PCR Not Detected Not Detected, Invalid   POCT CoV-2, Flu A/B, RSV by PCR    Collection Time: 05/29/25  2:39 PM   Result Value Ref Range    SARS-CoV-2 by PCR Negative Negative, Invalid    Influenza virus A RNA Negative Negative, Invalid    Influenza virus B, PCR Negative Negative, Invalid    RSV, PCR Negative Negative, Invalid          Medical Decision making and plan :  I personally reviewed prior external notes and test results pertinent to today's visit. Pt is clinically stable at today's acute urgent care visit.  Patient appears nontoxic with no acute distress noted. Appropriate for outpatient care at this time.    Ca 29 y.o. female presented clinic with:     Assessment & Plan  1. Right-sided pharyngitis.  - Reports pain and swelling on the right side of the throat, making it difficult to swallow and talk. Associated earache and coughing when the throat is dry and irritated.  - Physical examination reveals significant swelling and redness on the right side of the throat.  - Differential diagnosis includes viral pharyngitis, strep throat, COVID-19, and influenza. A swab test was negative for strep, Covid, Flu and RSV.  - Advised to use warm salt water gargles, honey, and lozenges for symptomatic relief. Rest and fluids.  Monitor symptoms closely and seek immediate medical attention if experiencing difficulty breathing, increased pain, or high fever, as these could indicate a tonsillar or throat abscess and need further evaluation urgently in ER.       Shared decision-making was utilized with patient for treatment plan. Medication discussed included indication for use and the potential benefits and side effects. Education was provided regarding the aforementioned assessments.  Differential Diagnosis, natural history, and supportive care discussed. All of the patient's questions were answered to their satisfaction at the time of discharge. Patient was encouraged to monitor symptoms closely. Those signs and symptoms which would warrant concern and mandate seeking a higher level of service through the emergency department discussed at length.  Patient stated agreement and understanding of this plan of care.    Disposition:  Home in stable condition     Voice Recognition Disclaimer:  Portions of this document were created using voice  recognition software and [employer] technology provided by [employer]. The software does have a chance of producing errors of grammar and possibly content. I have made every reasonable attempt to correct obvious errors, but there may be errors of grammar and possibly content that I did not discover before finalizing the  documentation.    MALCOM Cevallos        [1]   Current Outpatient Medications on File Prior to Visit   Medication Sig Dispense Refill    Prenatal MV-Min-Fe Fum-FA-DHA (PRENATAL 1 PO) Take  by mouth.       No current facility-administered medications on file prior to visit.

## (undated) DEVICE — SLEEVE VASO CALF MED - (10PR/CA)

## (undated) DEVICE — SENSOR OXIMETER ADULT SPO2 RD SET (20EA/BX)

## (undated) DEVICE — COVER LIGHT HANDLE ALC PLUS DISP (18EA/BX)

## (undated) DEVICE — PAD SANITARY 11IN MAXI IND WRAPPED  (12EA/PK 24PK/CA)

## (undated) DEVICE — LACTATED RINGERS INJ 1000 ML - (14EA/CA 60CA/PF)

## (undated) DEVICE — TOWELS CLOTH SURGICAL - (4/PK 20PK/CA)

## (undated) DEVICE — SODIUM CHL IRRIGATION 0.9% 1000ML (12EA/CA)

## (undated) DEVICE — GOWN SURGEONS LARGE - (32/CA)

## (undated) DEVICE — SUCTION INSTRUMENT YANKAUER BULBOUS TIP W/O VENT (50EA/CA)

## (undated) DEVICE — TRAY SRGPRP PVP IOD WT PRP - (20/CA)

## (undated) DEVICE — GLOVE BIOGEL PI INDICATOR SZ 6.5 SURGICAL PF LF - (50/BX 4BX/CA)

## (undated) DEVICE — Device

## (undated) DEVICE — TUBING CLEARLINK DUO-VENT - C-FLO (48EA/CA)

## (undated) DEVICE — GLOVE BIOGEL SZ 6.5 SURGICAL PF LTX (50PR/BX 4BX/CA)

## (undated) DEVICE — CANISTER SUCTION 3000ML MECHANICAL FILTER AUTO SHUTOFF MEDI-VAC NONSTERILE LF DISP  (40EA/CA)

## (undated) DEVICE — GOWN WARMING STANDARD FLEX - (30/CA)

## (undated) DEVICE — GLOVE, BIOGEL ECLIPSE, SZ 7.0, PF LTX (50/BX)

## (undated) DEVICE — TUBING D & E COLLECTION SET (50EA/PK)

## (undated) DEVICE — SET EXTENSION WITH 2 PORTS (48EA/CA) ***PART #2C8610 IS A SUBSTITUTE*****

## (undated) DEVICE — GLOVE SZ 6 BIOGEL PI MICRO - PF LF (50PR/BX 4BX/CA)

## (undated) DEVICE — VACURETTE 7MM STRAIGHT 10/PKG

## (undated) DEVICE — SET LEADWIRE 5 LEAD BEDSIDE DISPOSABLE ECG (1SET OF 5/EA)